# Patient Record
Sex: FEMALE | Race: WHITE
[De-identification: names, ages, dates, MRNs, and addresses within clinical notes are randomized per-mention and may not be internally consistent; named-entity substitution may affect disease eponyms.]

---

## 2019-04-04 NOTE — NUR
PATIENT IS ALERT AND ORIENTED. PATIENT IS SBA TO ONE ASSIST WITH CANE AND
GAITBELT. PATIENT IS ON 3L NC WHICH IS BASE. LINE. PATIENT HAS CHEST PAIN NON
CARDIAC FROM COPD TREATED WITH TYLENOL. PATIENT IS ACHS. PATIENT IS NSR TO ST
ON TELE. PATIENTS BP HAS BEEN STABLE SENSE COMING TO THE FLOOR. PATIENT LBM
WAS TODAY. PATIENT IS RESTING COMFORTABLY NO COMPLAINTS AT THIS TIME. WCM

## 2019-04-04 NOTE — NUR
ASSESSMENT: CM REVIEWED CHART AND MET WITH PATIENT AT THE BEDSIDE. PT IS ALERT
AND ORIENTED X4. PT REPORTS SHE LIVES AT HOME WITH HER FAMILY. PT REPORTS
ABOUT 5-10 STEPS TO ENTER HER HOME WITH HANDRAILS. PT REPORTS SHE IS CURRENTLY
IN SERVICES WITH [facility] AND PLANS TO RESUME AT DISCHARGE. CM
FAXED REFERRAL TO [facility]. PT STATES SHE HAS A WALKER AT HOME BUT
NORMALLY USES A CANE. PT STATES SHE HAS A BIPAP AT HOME AND ALSO OXYGEN AT
HOME WHICH IS ARRANGED THROUGH [facility] FOR HOME. PT PLANS TO RETURN HOME WITH HOME
HEALTH ONCE MEDICALLY STABLE.

## 2019-04-04 NOTE — NUR
PT ALERT AND ORIENTED TIMES FOUR. BP LOW BUT STABLE, 98% 3L, SR ON TELE. PT
C/O PAIN PRN PAIN MEDICATIONS GIVEN WITH GOOD RELEIF. PT UP TO CHAIR FOR MOST
OF THE DAY. PT TOLERATES MEDS AND MEALS. PT SLOWLY PROGRESSING TOWRADS POC
GOALS.

## 2019-04-05 NOTE — NUR
FALL RISK IN PLACE.  PT IS KNOWN TO UNIT FROM PREVIOUS VISITS.  FOLLOWING POC
WITH IVF TO BRING ELECTROLYTES BACK IN PARAMETERS.  BREATHING TREATMENTS
CONTINUING AND MONITORING BLOOD SUGARS.  PT CALLS OUT FOR PO PAIN MEDICATION
ON Q4 BASIS.  HOURLY ROUNDING.

## 2019-04-05 NOTE — NUR
Nutrition: assess d/t high BMI and sepsis dx. Pt admitted for UTI, early
sepsis, and hypotension. Pt was sleeping during attempted visit. No family
present. Pt has been admitted multiple times in the past and RD education has
been provided. Wt hx shows relatively stable wt of 250-300 lbs over past 4
years. Sepsis is improving per nursing. Consider low nutrition risk at this
time.

## 2019-04-05 NOTE — NUR
ON-GOING ASSESSMENT: CM REVIEWED CHART AND MET WITH PT. PT HAS ORDERS TO
DISCHARGE HOME TODAY WITH HH. CM NOTIFIED [facility] WHO STATES THEY CAN
ACCEPT HER AND CM FAXED DISCHARGE ORDERS AND CONFIRMED THEY RECEIVED IT. PT
REPORTS SHE DOES NOT HAVE TRANSPORTATION HOME. PT HAS MEDICAID SECONDARY SO CM
CONTACTED [facility] [phone] AND ARRANGED CAB TO TAKE PATIENT HOME. CM
NOTIFIED THEM THAT PATIENT IS REQUIRING OXYGEN AND THEY STATE THEY CANNOT
PROVIDE IT BUT IT WE SENT PT WITH A PORTABLE TANK THEY WILL RETURN IT. CM
VERIFIED WITH  DIRECTOR AND APPROVAL TO SEND WITH TANK THAT THEY WILL
RETURN.  TRIP# IS [id number]. BEDSIDE RN NOTIFIED AND [employer] IS TO CONTACT
THEM ONCE THEY HAVE ARRIVED.
CONTACT [facility] IF NEEDED [phone] TRIP#[id number].

## 2019-10-03 NOTE — EKG
[address] [address] [address]
[place]  [address]
Phone:  [phone]                    ELECTROCARDIOGRAM REPORT      
_______________________________________________________________________________
 
Name:       HARINI EASLEY                  Room #:                     REG ER  
M.R.#:      [id number]     Account #:      [id number]  
Admission:  10/03/19    Attend Phys:                          
Discharge:              Date of Birth:  [birth year]  
                                                          Report #: [id number]
   [id number]
_______________________________________________________________________________
THIS REPORT FOR:   //name//                          
 
                         [facility] ED
                                       
Test Date:    2019-10-03               Test Time:    14:23:36
Pat Name:     HARINI EASLEY               Department:   
Patient ID:   [id number]            Room:          
Gender:       F                        Technician:   
:          1970               Requested By: Cipriano Rapp
Order Number: [id number] MD:   Emir Mosquera
                                 Measurements
Intervals                              Axis          
Rate:         80                       P:            -3
OR:           162                      QRS:          16
QRSD:         102                      T:            -17
QT:           393                                    
QTc:          454                                    
                           Interpretive Statements
Sinus rhythm
Early transition
Baseline artifact
Compared to ECG 2019 16:19:16
Sinus tachycardia no longer present
Atrial premature complex(es) no longer present
 
Electronically Signed On 10-3-2019 16:18:21 CDT by Emir Mosquera
[email or web address]
 
 
 
 
 
 
 
 
 
 
 
 
 
 
 
 
  <ELECTRONICALLY SIGNED>
   By: Emir Mosquera MD    
  10/03/19     1618
D: 10/03/19 1423                           _____________________________________
T: 10/03/19 1423                           Emir Mosquera MD      /DELILAH

## 2019-10-22 NOTE — NUR
Pain Clinic Assessment:
 
1. History of Osteoarthritis:
Left Lower Extremity
Right Lower Extremity
   History of Rheumatoid Arthritis:
Not Applicable
 
2. Height: 5 ft. 2 in. 157.5 cm.
   Weight: 220.0 lb.  oz. 99.792 kg.
   Patient's BMI: 40.2
 
3. Vital Signs:
   BP: 113/76 Pulse: 102 Resp: 14
   Temp:  02 Sat: 97 ECG Mon:
 
4. Pain Intensity: 8
 
5. Fall Risk:
   Dizziness: Y  Needs help standing or walking: Y
   Fallen in the last 3 months: Y
   Fall risk comments:
 
 
6. Patient on Blood Thinner: PRADAXA
 
7. History of Hypertension: Y
 
8. Opioid Therapy greater than 6 weeks: Y
   Opiate Contract Signed:
 
9. Risk Assessment Tool Provided:
 
10. Functional Assessment Tool:
 
11. Recreational Drug Use: Past greater than 3 mos Drug Type: METH AND CRACK
    Tobacco Use: Former Smoker Tobacco Type: Cigarettes
       Amount or Packs/day:  How Many Years:
    Alcohol Use: No  Frequency:  Quant:

## 2019-11-05 NOTE — HPC
[facility]
[place]   [address]                     PAIN MANAGEMENT CONSULTATION  
_______________________________________________________________________________
 
Name:       HARINI EASLEY ROSE                  Room #:                     REG [facility]#:      [id number]                       Account #:      [id number]  
Admission:  10/22/19    Attend Phys:    Dominguez Banerjee DO  
Discharge:              Date of Birth:  [birth year]  
                                                          Report #: [id number]
                                                                    [id number]   
_______________________________________________________________________________
THIS REPORT FOR:   //name//                          
 
CC: Dominguez Anderson MD
 
DATE OF SERVICE:  10/22/2019
 
 
REFERRING PHYSICIAN:  Dr. Micheline Anderson.
 
CHIEF COMPLAINT:  Neck pain, bilateral upper extremity pain with paresthesias,
low back pain, bilateral lower extremity pain with paresthesias.
 
HISTORY OF PRESENT ILLNESS:  As you know, the patient is a very unfortunate
49-year-old female referred to our service to discuss the possibility of
undergoing cervical epidural injections under fluoroscopic guidance.  The
patient reports she has had longstanding pain issues with progressively
worsening symptoms.  She has sought evaluation from multiple pain physicians. 
We are the third pain physician the patient is seen.  She has recently been seen
at pain care of  who advised the patient that "her case was too dangerous to
be performed in an outpatient setting."  The patient returned to her PCP and was
subsequently referred to our clinic to discuss this option for interventional
treatments to be provided at a hospital setting.  The patient indicates she has
had pain since 2000.  She denies injury or trauma.  She has had neck pain
issues, specifically on and off since the year 2000, has had chronic low back
pain and lower extremity symptoms, for which she has undergone epidural
injections in the past without complication.  She has unfortunately received
very little benefit with epidural injections in the lumbar spine, reporting only
2-day improvement with these procedures.  She has subsequently discontinued
these activities.  She reports to us today without any new imaging of the lumbar
spine and thus I cannot comment further on this issue.  She does report pain in
the neck, for which she gives a rating of 10/10.  She has minor changes in the
cervical spine based on MRI nearly 1 year ago.  She was referred to our clinic
to discuss the options for treatment.  The patient states that she has had a
history of polysubstance abuse in the past.  She does indicate that her primary
care physician has started her on hydrocodone 10/325 and has had her on those
for about 3 years.  She is requesting to continue this medication and has been
advised she needs to contact our clinic to be seen and advise whether or not
this is an appropriate treatment course.  She is also requesting this
information will be provided to the prescribing physician, Dr. Anderson.
 
PAST MEDICAL HISTORY:
1.  Diabetes mellitus type 2.
2.  Seizures. 
3.  Asthma.
 
 
 
[place] [address]                     PAIN MANAGEMENT CONSULTATION  
_______________________________________________________________________________
 
Name:       TRACEEHARINI                  [id number]                       Account #:      [id number]  
Admission:  10/22/19    Attend Phys:    Dominguez Banerjee DO  
Discharge:              Date of Birth:  [birth year]  
                                                          Report #: [id number]
                                                                    [id number]   
_______________________________________________________________________________
4.  Hypertension.
5.  Chronic obstructive pulmonary disease.
6.  Chronic kidney disease.
7.  Gastroesophageal reflux disease.
8.  Degenerative joint disease.
9.  Osteoarthritis.
10.  History of necessary anticoagulation.
11.  Restless leg syndrome.
12.  Anxiety disorder.
13.  Chronic intractable pain.
 
PAST SURGICAL HISTORY:
1.  Partial hysterectomy.
2.  Dilation and curettage.
3.  Rectocele excision.
4.  Implantation of a transitional mesh.
 
SOCIAL HISTORY:  The patient is a reformed smoker, quit about 6 years ago with
3-pack-year history of smoking.  She denies IV or illicit drug use, but has an
extensive history of this.  She denies any chronic alcohol use.  She is on
disability for nearly 4 years.  She is not in litigation in regards to her
symptoms.  She is unaccompanied today.
 
REVIEW OF SYSTEMS:  Positive for weight gain, headaches, eye disease, wearing
corrective eyewear, blurred and double vision, cataracts, hearing loss with
tinnitus, chronic sinus problems with rhinitis, shortness of breath, heart
trouble, palpitations, nausea, vomiting, frequent urination, nocturia,
lightheadedness, dizziness, convulsions and seizures, numbness and tingling
sensations, nervousness, depression, diabetes mellitus insulin dependent,
excessive thirst and urination, slow to heal after cuts.  All other review of
systems negative per 12-point review of systems other than those listed in
history of present illness.
 
Pain impact score 48 of 70 indicating severe interference of daily activities
secondary to pain.
 
ALLERGIES:  PAPER TAPE, LATEX, SULFA, PROCHLORPERAZINE, FENTANYL, TRAMADOL,
BUSPIRONE, KETOROLAC, PENICILLIN, ALPRAZOLAM, METAXALONE.
 
CURRENT MEDICATIONS:  Doxycycline 100 mg twice a day, magnesium oxide 64 mg
twice a day, potassium chloride 20 mEq p.o. every day, furosemide 20 mg twice a
day, acetaminophen 325 mg every 6 hours, nystatin twice a day, Latuda 20 mg once
a day, ibuprofen 800 mg twice a day, losartan 25 mg once a day, MiraLax 17 grams
once a day, metformin 500 mg twice a day, Humalog sliding scale, lispro 5 units
with meals, Pradaxa 75 mg once a day, cyclobenzaprine 10 mg 3 times a day
p.r.n., Toujeo SoloStar 15 units twice a day, Tradjenta 5 mg once a day, [facility]
[address] [place]   [address]                     PAIN MANAGEMENT CONSULTATION  
_______________________________________________________________________________
 
Name:       HARINI EASLEY ROSE                  Room #:                     REG [facility]#:      [id number]                       Account #:      [id number]  
Admission:  10/22/19    Attend Phys:    Dominguez Banerjee DO  
Discharge:              Date of Birth:  [birth year]  
                                                          Report #: [id number]
                                                                    [id number]   
_______________________________________________________________________________
4 mg twice a day, diazepam 5 mg p.r.n., escitalopram 10 mg per day, diazepam 10
mg p.o. at bedtime, latanoprost one drop each eye per day, Keppra 500 mg twice a
day, gabapentin 400 mg 3 times a day, ropinirole 0.25 mg 3 times a day, Advair
250/50 one puff b.i.d., albuterol 2 puffs q. 4 hours p.r.n., montelukast sodium
10 mg per day.
 
IMAGING:  MRI cervical spine 12/29/2018 shows mild to moderate neural foraminal
stenosis and uncovertebral facet arthropathy at the C5-C6, C6-C7 and there is
mild diffuse disk bulging at C4-C5, C5-C6, C6-C7 and C7-T1 and no significant
central canal stenosis.
 
PQRS:  The patient has arthritic changes of the lumbar spine, bilateral hips,
bilateral knees and cervical spine.  No rheumatoid arthritis, placing pain
intensity at 8/10.  She is at a fall risk and has had multiple falls.  She is
using ambulatory devices.  She is on blood thinners in the form of Pradaxa.  She
is treated for hypertension.  She is on chronic opioids.  She has a high risk
for opioid addiction based on our assessment tool.  Pain impact is 48 of 70,
severe interference of daily activities secondary to pain.
 
PHYSICAL EXAMINATION:
VITAL SIGNS:  Blood pressure 113/76, pulse 102, respiratory rate 14 and
unlabored.  The patient is 97% on room air.  Height 5 feet 2 inches tall, weight
220 pounds, BMI calculated 40.2.
GENERAL:  Well-developed, well-nourished, well-hydrated 49-year-old female
appearing much older than stated age.  She is placing pain today at 8/10.
HEENT:  Normocephalic, atraumatic.  Pupils equal, round, reactive to light. 
Extraocular muscles are intact.  Sclerae are nonicteric without injection.
NEUROLOGIC:  Cranial nerves 2-12 are grossly intact.  Speech is fluent.  The
patient deemed a poor historian.
LUNGS:  Decreased breath sounds bilaterally, prolonged expiratory phase.  The
patient is utilizing concentrated oxygen to maintain O2 saturation.  There is
expiratory wheezing noted bilaterally.
CARDIOVASCULAR:  Regular.  No appreciable gallop, no rub.
ABDOMEN:  Soft, obese.  Bowel sounds are present.
EXTREMITIES:  Show clubbing in the upper extremities that is mild in nature, no
cyanosis, and no edema.
MUSCULOSKELETAL:  There is some palpatory tenderness noted over the paraspinal
musculature of cervical spine.  No spinous process tenderness.  Upper extremity
strength is deconditioned bilaterally, but strength is equal.  Muscle bulk and
tone is equal and symmetrical in comparing left upper extremity to right upper
extremity.  Deep tendon reflexes are reduced bilaterally, 1+/4 but symmetrical. 
Cervical provocation testing is met with increasing pain with lateral flexion,
rotation and extension, both the left and right.  Spurling's test is negative.
 
ASSESSMENT:
1.  Chronic cervical radiculopathy.
 
 
 
[address]   [address]                     PAIN MANAGEMENT CONSULTATION  
_______________________________________________________________________________
 
Name:       HARINI EASLEY                  Room #:                     REG CLI 
[facility]#:      [id number]                       Account #:      [id number]  
Admission:  10/22/19    Attend Phys:    Dominguez Banerjee DO  
Discharge:              Date of Birth:  [birth year]  
                                                          Report #: [id number]
                                                                    [id number]   
_______________________________________________________________________________
2.  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
3.  Cervical facet syndrome.
4.  History of polysubstance abuse.
5.  History of chronic low back pain.
 
PLAN:
1.  Based on today's physical exam and history the patient has provided, the
description the patient uses in regards to pain as well as the findings of the
year old MRI, it would appear the symptoms that the patient is experiencing are
due to the foraminal stenosis at C4-C5 and C5-C6.  I am pleased to advise the
patient that there are no central canal stenosis issues present.  After we have
reviewed the patient's year old MRI, we discussed the potential treatment
options with the patient today.  The following was discussed with the patient,
the treatment options we would recommend.
 
We discussed physical therapy, stretching exercises and traction techniques. 
This could be quite effective in this patient's case as her symptoms are related
to foraminal stenosis, which you are aware are formed by the arch of the
vertebral body above and the vertebral body below and thus traction techniques
can alleviate some of the pressure on the nerves as they exit the neural
foramen.  We discussed medication management, for which we would only recommend
nonsteroidal anti-inflammatory and escalating doses of neuropathic medications. 
She is currently on a fairly low dose of gabapentin, which could be escalated
quite easily.  Other medications that could be added nortriptyline,
amitriptyline and possible Cymbalta, though this would have to be exchanged with
her antidepressant medications she is currently on.  We discussed cervical
epidural injections, for which the patient was referred to our clinic.  We also
discussed surgical options with the patient today.  After reviewing the risks
and the benefits of all proposed treatment options, the patient chose to undergo
a cervical epidural injection under fluoroscopic guidance.
 
2.  The patient was advised that due to third party payer restrictions,
authorization will have to be obtained.  Authorization could take anywhere from
7-10 days.  We will begin this authorization process immediately.  Once this
authorization has been completed, we will then contact the patient and have her
come off the Pradaxa in an appropriate amount of timeframe based on ANTONINO
guidelines.  The patient will need to gain clearance from the prescribing
physician to come off the Pradaxa to be able to undergo the procedure.  Pradaxa
must be discontinued based on ANTONINO guidelines, no foreshortening of the
timeframe.  She will have to be off the medication in appropriate timeframe.
 
3.  The patient has indicated to this physician that she is currently utilizing
hydrocodone 10/325 up to 3 tablets per day.  Apparently, she has been on this
medication for an extended period of time.  This was initiated by her PCP.  I
have advised the patient at this appointment that we are not actively taking
opioid management patients.  Given the current political environment around
 
 
 
[place] [address]                     PAIN MANAGEMENT CONSULTATION  
_______________________________________________________________________________
 
Name:       HARINI EASLEY                  Room #:                     REG CL 
[facility]#:      [id number]                       Account #:      [id number]  
Admission:  10/22/19    Attend Phys:    Dominguez Banerjee DO  
Discharge:              Date of Birth:  [birth year]  
                                                          Report #: [id number]
                                                                    [id number]   
_______________________________________________________________________________
opioids in the recent completed lawsuits against opioid manufactures, we do not
feel these medications will be available for any length of time and thus we are
not initiating any patients on opioid medications that have not been on them
prescribed through our services.  All these medications will ultimately be
discontinued whether it is from a legal ramification or by [facility]MAR discontinuing the
use of these medications in chronic pain, it remains to be seen, but again we
are not taking on any new opioid medication patients at this time.  The patient
indicates that she is comfortable with her medication; it allows her to go about
her activities of daily living.  Obviously, the primary care physician who
initiated this therapy considers this as a viable option for treatment and we
will defer to them if they wish to continue this therapy.  We again will not be
involved in opioid medication management in this patient's case.
 
4.  We will see the patient back in followup visit for the cervical epidural
injection proposed to treat the neck pain that she is experiencing.  We have
advised the patient that she is a surgical candidate, though her underlying
health conditions may preclude her from undergoing surgery.  She does have
pathology at the C4-C5 and C5-C6 levels, which may be amenable to surgical
approach.  She could consider this option.  We would recommend she follow up
with her PCP in regards to referrals for this type of surgical procedure if she
wishes to do so.
 
5.  The patient has sought treatment from multiple pain physicians.  She has
been advised by each of those physicians that resolution of her pain is not
possible.  We have reiterated this today.  Pain the type that the patient is
experiencing is difficult at best to get controlled.  We will do our best to
provide injections to improve her cervical radicular symptoms, but if these are
ineffective, more aggressive treatment will likely be necessary.
 
6.  We wish to thank Dr. Anderson for the referral of the patient to our clinic. 
We will keep you apprised of response to treatment as we address cervical
radiculopathy with cervical epidural injections.  Again, we wish to thank Dr. Anderson for this opportunity.
 
 
 
 
 
 
 
 
 
 
 
  <ELECTRONICALLY SIGNED>
   By: Dominguez Banerjee DO          
  11/05/19     1247
D: 10/22/19 1824                           _____________________________________
T: 10/22/19 2225                           Dominguez Banerjee DO            /nt

## 2020-07-01 ENCOUNTER — HOSPITAL ENCOUNTER (EMERGENCY)
Dept: HOSPITAL 35 - ER | Age: 50
Discharge: HOME | End: 2020-07-01
Payer: COMMERCIAL

## 2020-07-01 VITALS — DIASTOLIC BLOOD PRESSURE: 74 MMHG | SYSTOLIC BLOOD PRESSURE: 109 MMHG

## 2020-07-01 VITALS — HEIGHT: 62 IN | BODY MASS INDEX: 45.08 KG/M2 | WEIGHT: 245 LBS

## 2020-07-01 DIAGNOSIS — Z79.84: ICD-10-CM

## 2020-07-01 DIAGNOSIS — Z87.891: ICD-10-CM

## 2020-07-01 DIAGNOSIS — J44.9: ICD-10-CM

## 2020-07-01 DIAGNOSIS — E11.9: ICD-10-CM

## 2020-07-01 DIAGNOSIS — Z88.2: ICD-10-CM

## 2020-07-01 DIAGNOSIS — Z88.8: ICD-10-CM

## 2020-07-01 DIAGNOSIS — Z91.040: ICD-10-CM

## 2020-07-01 DIAGNOSIS — Z91.048: ICD-10-CM

## 2020-07-01 DIAGNOSIS — Z88.0: ICD-10-CM

## 2020-07-01 DIAGNOSIS — Z90.710: ICD-10-CM

## 2020-07-01 DIAGNOSIS — Z79.899: ICD-10-CM

## 2020-07-01 DIAGNOSIS — K85.90: Primary | ICD-10-CM

## 2020-07-01 DIAGNOSIS — Z79.2: ICD-10-CM

## 2020-07-01 LAB
ALBUMIN SERPL-MCNC: 3.2 G/DL (ref 3.4–5)
ALT SERPL-CCNC: 33 U/L (ref 30–65)
ANION GAP SERPL CALC-SCNC: 8 MMOL/L (ref 7–16)
ANISOCYTOSIS BLD QL SMEAR: (no result)
AST SERPL-CCNC: 27 U/L (ref 15–37)
BASOPHILS NFR BLD AUTO: 1 % (ref 0–2)
BILIRUB SERPL-MCNC: < 0.1 MG/DL (ref 0.2–1)
BILIRUB UR-MCNC: NEGATIVE MG/DL
BUN SERPL-MCNC: 14 MG/DL (ref 7–18)
CALCIUM SERPL-MCNC: 8.4 MG/DL (ref 8.5–10.1)
CHLORIDE SERPL-SCNC: 100 MMOL/L (ref 98–107)
CO2 SERPL-SCNC: 30 MMOL/L (ref 21–32)
COLOR UR: YELLOW
CREAT SERPL-MCNC: 0.8 MG/DL (ref 0.6–1)
EOSINOPHIL NFR BLD: 0 % (ref 0–3)
ERYTHROCYTE [DISTWIDTH] IN BLOOD BY AUTOMATED COUNT: 25.5 % (ref 10.5–14.5)
GLUCOSE SERPL-MCNC: 100 MG/DL (ref 74–106)
GRANULOCYTES NFR BLD MANUAL: 65 % (ref 36–66)
HCT VFR BLD CALC: 30.7 % (ref 37–47)
HGB BLD-MCNC: 9.8 GM/DL (ref 12–15)
INR PPP: 1
KETONES UR STRIP-MCNC: NEGATIVE MG/DL
LIPASE: 482 U/L (ref 73–393)
LYMPHOCYTES NFR BLD AUTO: 25 % (ref 24–44)
MCH RBC QN AUTO: 24.9 PG (ref 26–34)
MCHC RBC AUTO-ENTMCNC: 32 G/DL (ref 28–37)
MCV RBC: 77.9 FL (ref 80–100)
MICROCYTES: (no result)
MONOCYTES NFR BLD: 9 % (ref 1–8)
NEUTROPHILS # BLD: 9.2 THOU/UL (ref 1.4–8.2)
PLATELET # BLD EST: NORMAL 10*3/UL
PLATELET # BLD: 301 THOU/UL (ref 150–400)
POTASSIUM SERPL-SCNC: 3.6 MMOL/L (ref 3.5–5.1)
PROT SERPL-MCNC: 6.7 G/DL (ref 6.4–8.2)
PROTHROMBIN TIME: 9.9 SECONDS (ref 9.3–11.4)
RBC # BLD AUTO: 3.94 MIL/UL (ref 4.2–5)
RBC # UR STRIP: NEGATIVE /UL
SCHISTOCYTES BLD QL SMEAR: (no result)
SODIUM SERPL-SCNC: 138 MMOL/L (ref 136–145)
SP GR UR STRIP: <= 1.005 (ref 1–1.03)
TROPONIN I SERPL-MCNC: <0.06 NG/ML (ref ?–0.06)
URINE CLARITY: (no result)
URINE GLUCOSE-RANDOM*: NEGATIVE
URINE LEUKOCYTES-REFLEX: NEGATIVE
URINE NITRITE-REFLEX: NEGATIVE
URINE PROTEIN (DIPSTICK): NEGATIVE
UROBILINOGEN UR STRIP-ACNC: 0.2 E.U./DL (ref 0.2–1)
WBC # BLD AUTO: 14.2 THOU/UL (ref 4–11)

## 2020-07-02 NOTE — EKG
[facility]
[place]   [address]                     ELECTROCARDIOGRAM REPORT      
_______________________________________________________________________________
 
Name:       HARINI EASLEY                  Room #:                     [facility]#:      [id number]                       Account #:      [id number]  
Admission:  20    Attend Phys:                          
Discharge:  20    Date of Birth:  [birth year]  
                                                          Report #: [id number]
                                                                    [id number]
_______________________________________________________________________________
THIS REPORT FOR:  
 
cc:  Micheline Anderson MD, Nora P. MD Lundgren, Craig H. MD [facility]                                        ~
THIS REPORT FOR:   //name//                          
 
                         [facility] ED
                                       
Test Date:    2020               Test Time:    12:11:23
Pat Name:     HARINI EASLEY               Department:   
Patient ID:   [id number]            Room:          
Gender:       F                        Technician:   [facility]
:          1970               Requested By: Lata Da Silva
Order Number: [id number] MD:   Aleksandar Iraheta
                                 Measurements
Intervals                              Axis          
Rate:         104                      P:            39
WV:           137                      QRS:          28
QRSD:         97                       T:            73
QT:           388                                    
QTc:          511                                    
                           Interpretive Statements
Sinus tachycardia
Borderline repolarization abnormality
Prolonged QT interval
Compared to ECG 10/03/2019 14:23:36
Prolonged QT interval now present
Electronically Signed On 2020 8:49:24 CDT by Aleksandar Iraheta
[email or web address]
 
 
 
 
 
 
 
 
 
 
 
 
 
 
  <ELECTRONICALLY SIGNED>
   By: Aleksandar Iraheta MD, [facility]   
  20     0849
D: 20 1211                           _____________________________________
T: 20 1211                           Aleksandar Iraheta MD, [facility]     /EPI

## 2020-12-20 ENCOUNTER — HOSPITAL ENCOUNTER (INPATIENT)
Dept: HOSPITAL 96 - M.ERS | Age: 50
LOS: 4 days | Discharge: HOME HEALTH | DRG: 917 | End: 2020-12-24
Attending: INTERNAL MEDICINE | Admitting: INTERNAL MEDICINE
Payer: COMMERCIAL

## 2020-12-20 VITALS — HEIGHT: 65.98 IN | WEIGHT: 293 LBS | BODY MASS INDEX: 47.09 KG/M2

## 2020-12-20 VITALS — SYSTOLIC BLOOD PRESSURE: 117 MMHG | DIASTOLIC BLOOD PRESSURE: 62 MMHG

## 2020-12-20 DIAGNOSIS — E66.2: ICD-10-CM

## 2020-12-20 DIAGNOSIS — Z20.828: ICD-10-CM
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
## 2020-12-20 DIAGNOSIS — J96.21: ICD-10-CM

## 2020-12-20 DIAGNOSIS — G43.909: ICD-10-CM

## 2020-12-20 DIAGNOSIS — Z79.899: ICD-10-CM

## 2020-12-20 DIAGNOSIS — T40.2X1A: Primary | ICD-10-CM

## 2020-12-20 DIAGNOSIS — E78.5: ICD-10-CM

## 2020-12-20 DIAGNOSIS — K21.9: ICD-10-CM

## 2020-12-20 DIAGNOSIS — J69.0: ICD-10-CM

## 2020-12-20 DIAGNOSIS — J96.22: ICD-10-CM

## 2020-12-20 DIAGNOSIS — Z87.891: ICD-10-CM

## 2020-12-20 DIAGNOSIS — Z88.2: ICD-10-CM

## 2020-12-20 DIAGNOSIS — Z88.8: ICD-10-CM

## 2020-12-20 DIAGNOSIS — Z23: ICD-10-CM

## 2020-12-20 DIAGNOSIS — Z79.84: ICD-10-CM

## 2020-12-20 DIAGNOSIS — F32.9: ICD-10-CM

## 2020-12-20 DIAGNOSIS — Z90.49: ICD-10-CM

## 2020-12-20 DIAGNOSIS — E11.9: ICD-10-CM

## 2020-12-20 DIAGNOSIS — Z86.73: ICD-10-CM

## 2020-12-20 DIAGNOSIS — G92: ICD-10-CM

## 2020-12-20 DIAGNOSIS — F41.1: ICD-10-CM

## 2020-12-20 DIAGNOSIS — G89.29: ICD-10-CM

## 2020-12-20 DIAGNOSIS — Z88.0: ICD-10-CM

## 2020-12-20 LAB
ABSOLUTE BASOPHILS: 0 THOU/UL (ref 0–0.2)
ABSOLUTE EOSINOPHILS: 0.1 THOU/UL (ref 0–0.7)
ABSOLUTE MONOCYTES: 0.6 THOU/UL (ref 0–1.2)
ALBUMIN SERPL-MCNC: 3.1 G/DL (ref 3.4–5)
ALP SERPL-CCNC: 71 U/L (ref 46–116)
ALT SERPL-CCNC: 35 U/L (ref 30–65)
ANION GAP SERPL CALC-SCNC: 9 MMOL/L (ref 7–16)
AST SERPL-CCNC: 15 U/L (ref 15–37)
BASOPHILS NFR BLD AUTO: 0.3 %
BE: 5.3 MMOL/L
BILIRUB SERPL-MCNC: 0.1 MG/DL
BUN SERPL-MCNC: 17 MG/DL (ref 7–18)
CALCIUM SERPL-MCNC: 8.8 MG/DL (ref 8.5–10.1)
CHLORIDE SERPL-SCNC: 96 MMOL/L (ref 98–107)
CO2 SERPL-SCNC: 35 MMOL/L (ref 21–32)
CREAT SERPL-MCNC: 0.9 MG/DL (ref 0.6–1.3)
EOSINOPHIL NFR BLD: 1 %
GLUCOSE SERPL-MCNC: 207 MG/DL (ref 70–99)
GRANULOCYTES NFR BLD MANUAL: 73.2 %
HCT VFR BLD CALC: 34.9 % (ref 37–47)
HGB BLD-MCNC: 11.3 GM/DL (ref 12–15)
LYMPHOCYTES # BLD: 2.1 THOU/UL (ref 0.8–5.3)
LYMPHOCYTES NFR BLD AUTO: 20.1 %
MAGNESIUM SERPL-MCNC: 1.6 MG/DL (ref 1.8–2.4)
MCH RBC QN AUTO: 28.8 PG (ref 26–34)
MCHC RBC AUTO-ENTMCNC: 32.5 G/DL (ref 28–37)
MCV RBC: 88.5 FL (ref 80–100)
MONOCYTES NFR BLD: 5.4 %
MPV: 6.9 FL. (ref 7.2–11.1)
NEUTROPHILS # BLD: 7.5 THOU/UL (ref 1.6–8.1)
NT-PRO BRAIN NAT PEPTIDE: 41 PG/ML (ref ?–300)
NUCLEATED RBCS: 0 /100WBC
PCO2 BLD: 79.3 MMHG (ref 35–45)
PLATELET COUNT*: 340 THOU/UL (ref 150–400)
PO2 BLD: 83.3 MMHG (ref 75–100)
POTASSIUM SERPL-SCNC: 2.9 MMOL/L (ref 3.5–5.1)
PROT SERPL-MCNC: 6.5 G/DL (ref 6.4–8.2)
RBC # BLD AUTO: 3.95 MIL/UL (ref 4.2–5)
RDW-CV: 14.8 % (ref 10.5–14.5)
SODIUM SERPL-SCNC: 140 MMOL/L (ref 136–145)
WBC # BLD AUTO: 10.3 THOU/UL (ref 4–11)

## 2020-12-20 NOTE — EMS
[facility] 
[address] [place]  [address]                    EMS Patient Care Report       
_______________________________________________________________________________
 
Name:       HARINI EASLEY                   Room:           M.212-P    DIS IN  
M.R.#:  [id number]      Account #:      [id number]  
Admission:  20     Attend Phys:    Rachana Hamilton MD 
Discharge:  20     Date of Birth:  [birth year]  
         Report #: [id number]
                                                                     [id number]
_______________________________________________________________________________
THIS REPORT FOR:  //name//                      
 
Report Transmitted: 2020 12:11
EMS Care Summary
SLOANE VERNON
Incident [id number] @ 2020 21:36
 
Incident Location
[address] [place] [address]
 
Patient
Harini Esaley
Female, 50 Years
 1970
 
Patient Address
[address] [place] [address]
 
Patient History
Chronic Obstructive Pulmonary Disease (COPD),Type 2 diabetes mellitus,Obesity, 
unspecified,Anxiety disorder, unspecified,Edema, 
unspecified,Cholecystectomy,Hyperlipidemia,Gastro-Esophageal Reflux Disease 
(GERD),Polyneuropathy, unspecified,Other chronic pain, 
 
Patient Allergies
,
 
Patient Medications
Oxygen,
 
Chief Complaint
Altered Level of Consciousness
 
Disposition
Transported No Lights/[place]
 
Dispatch Reason
Diabetic Problem
 
Transported To
[facility]
 
Narrative
 WAS DISPATCHED TO A RESIDENCE FOR A 50 YEAR OLD FEMALE WITH AN ALTERED 
 
 
 
[facility] 
[address] [place]  [address]                    EMS Patient Care Report       
_______________________________________________________________________________
 
Name:       HARINI EASLEY                   Room:           M.212-P    DIS IN  
M.R.#:  [id number]      Account #:      [id number]  
Admission:  20     Attend Phys:    Rachana Hamilton MD 
Discharge:  20     Date of Birth:  [birth year]  
         Report #: [id number]
                                                                     [id number]
_______________________________________________________________________________
LEVEL OF CONSCIOUSNESS. UPON ARRIVAL, [facility] AND [facility] PERSONNEL ENTERED THE 
RESIDENCE AND MADE CONTACT WITH A FEMALE SITTING IN A COMFORTABLE UPRIGHT 
POSITION ON THE COUCH IN A STUPOROUS STATE WITH UNLABORED RESPIRATIONS AND 
RECEIVING HOME OXYGEN VIA NASAL CANNULA AT 4LPM. A MALE RESIDENT ON SCENE (THE 
CALLER) REPORTED WALKING INTO THE ROOM TO FIND HARINI IN AN ALTERED STATE AND 
WAS UNABLE TO GET HER TO RESPOND. HARINI WOULD OPEN HER EYES AND RESPOND WITH 1 
WORD SENTENCES (OR NOISES) WITH CONTINUAL VERBAL STIMULI AND THE OCCASIONAL 
NEED FOR PAINFUL STIMULI. VITAL SIGNS WERE OBTAINED AND A PRIMARY ASSESSMENT 
WAS EXECUTED WITH AN OXYGEN SATURATION OF % ON 4 LPM HOME OXYGEN AND PINPOINT 
PUPILS. NARCAN WAS ADMINISTERED INTRANASALLY CAUSING QUICK IMPROVEMENT IN 
RESPONSIVENESS AND OXYGEN WAS INCREASED TO 12 LPM VIA NRB. HARINI WAS ABLE TO 
ANSWER SOME QUESTIONS AND FOLLOW INSTRUCTIONS WITH ASSISTANCE BUT REMAINED 
LETHARGIC AS SHE WAS TRANSFERRED OUTSIDE TO THE [facility] VIA STAIRCHAIR WHERE SHE 
WAS SECURED VIA 5 SAFETY STRAPS PRIOR TO BEING SECURED INTO THE AMBULANCE. 
WHILE IN THE AMBULANCE, HARINI REPORTED MILD SHORTNESS OF AIR AND DIZZINESS 
THROUGHOUT THE DAY AND STATED THAT SHE DOESNT REMEMBER EMS ARRIVING AT THE 
HOUSE. TREATMENTS AND INTERVENTIONS WERE CONTINUED DURING TRANSPORT TO [facility] WITH SOME IMPROVEMENT IN OXYGEN SATURATION AND MENTAL STATUS THOUGH 
HARINI REMAINED LETHARGIC. UPON ARRIVAL, HARINI WAS REMOVED FROM THE UNIT AND 
TAKEN INSIDE TO ER 1 WHERE SHE WAS TRANSFERRED LATERALLY ONTO THE ER BED VIA 
DRAW SHEET. REPORT WAS GIVEN TO THE RECEIVING RN AND CARE WAS TRANSFERRED. [facility] 
320 BACK IN SERVICE. 
 
Initial Vitals
@21:47SpO2: 65,
@21:58SpO2: 97,
@22:00SpO2: 82,
@22:03SpO2: 84,
@22:05SpO2: 85,
@22:10SpO2: 84,
@22:27SpO2: 78,
@21:55
@22:03
@22:04
@22:12Temp: [id number],
@21:44P: 74,R: 24,BP: 116/50,
@22:35P: 89,R: 24,BP: 124/68,
@22:76GjNI9: 52,
@22:45YdEW4: 67,
@22:45TgEQ9: 49,
@22:88WtKG9: 61,
@22:58XzPR0: 64,
@22:84QvRI2: 57,
@22:05GrKW6: 57,
@22:05YaQN8: 62,
@22:91NuYW8: 66,
 
 
 
[place] [address]                    EMS Patient Care Report       
_______________________________________________________________________________
 
Name:       HARINI EASLEY                   Room:           [address] [place]  
..#:  [id number]      Account #:      [id number]  
Admission:  20     Attend Phys:    Rachana Hamilton MD 
Discharge:  20     Date of Birth:  [birth year]  
         Report #: [id number]
                                                                     [id number]
_______________________________________________________________________________
@21:44GCS: 9,
@22:35GCS: 15,
@22:10
@21:44Glucose: 215,
@22:10Glucose: 260,
 
Assessments
@21:44MENTAL:SKIN:HEENT:LUNG 
SOUNDS:ABDOMEN:PELVIS//GI:EXTREMITIES:PULSE:NEURO: 
 
Impression
Acute Respiratory Distress (Dyspnea)
 
Procedures
@21:49Naloxone - 1.000 Milligrams (mg) - IntranasalResponse: 
Improved@21:50Other - Medication - 12.000 Liters per Minute (l/min [fluid]) - 
Non-Rebreather MaskResponse: Improved@22:28Other - Medication - 12.000 Liters 
per Minute (l/min [fluid]) - InhalationResponse: Improved@22:28Albuterol - 
2.500 Milligrams (mg) - InhalationResponse: Improved@22:28Ipratropium - 0.500 
Milligrams (mg) - InhalationResponse: Improved@22:37Naloxone - 1.000 Milligrams 
(mg) - Intravenous (IV)Response: Unchanged@22:10 cc () Site: 
Antecubital-LeftResponse: UnchangedFailed@22:14 cc () Site: Hand-RightResponse: 
UnchangedFailed@22:20 cc () Site: Other Peripheral (Not Listed)Response: 
UnchangedSucceeded@22:03Digital respired carbon dioxide monitoring 
(regime/therapy)Response: UnchangedSucceeded@22:05Digital respired carbon 
dioxide monitoring (regime/therapy)Response: UnchangedSucceeded@22:10Digital 
respired carbon dioxide monitoring (regime/therapy)Response: 
UnchangedSucceeded@22:12Digital respired carbon dioxide monitoring 
(regime/therapy)Response: UnchangedSucceeded@22:17Digital respired carbon 
dioxide monitoring (regime/therapy)Response: UnchangedSucceeded@22:22Digital 
respired carbon dioxide monitoring (regime/therapy)Response: 
UnchangedSucceeded@22:22Digital respired carbon dioxide monitoring 
(regime/therapy)Response: UnchangedSucceeded@22:27Digital respired carbon 
dioxide monitoring (regime/therapy)Response: UnchangedSucceeded@22:32Digital 
respired carbon dioxide monitoring (regime/therapy)Response: 
UnchangedSucceeded@21:553-Lead ECGResponse: UnchangedSucceeded@22:0312-Lead 
ECGResponse: UnchangedSucceeded@22:0412-Lead ECGResponse: 
UnchangedSucceeded@22:00Isolation precautions (procedure)Response: 
UnchangedSucceeded 
 
Timeline
21:36,Call Received
21:36,Dispatch Notified
21:36,Psap Call
21:36,Dispatched
21:39,En Route
 
 
 
[place] [address]                    EMS Patient Care Report       
_______________________________________________________________________________
 
Name:       HARINI EASLEY                   Room:           M.212-P    DIS IN  
M.R.#:  [id number]      Account #:      [id number]  
Admission:  20     Attend Phys:    Rachana Hamilton MD 
Discharge:  20     Date of Birth:  [birth year]  
         Report #: [id number]
                                                                     [id number]
_______________________________________________________________________________
21:42,On Scene
21:44,At Patient
21:44,BP: 116/50 M,PULSE: 74,RR: 24 R,SPO2:  Ox,ETCO2:  ,BG: ,PAIN: ,GCS: ,
21:44,BP: / M,PULSE: ,RR:  R,SPO2:  Ox,ETCO2:  ,BG: ,PAIN: ,GCS: 9,
21:44,BP: / M,PULSE: ,RR:  R,SPO2:  Ox,ETCO2:  ,B,PAIN: ,GCS: ,
21:47,BP: / M,PULSE: ,RR:  R,SPO2: 65 Ox,ETCO2:  ,BG: ,PAIN: ,GCS: ,
21:49,Naloxone - 1.000 Milligrams (mg) - Intranasal,Response: Improved
21:50,Other - Medication - 12.000 Liters per Minute (l/min [fluid]) - 
Non-Rebreather Mask,Response: Improved 
21:55,3-Lead ECG,Response: UnchangedSucceeded,
21:55,BP: / M,PULSE: ,RR:  R,SPO2:  Ox,ETCO2:  ,BG: ,PAIN: ,GCS: ,
21:58,BP: / M,PULSE: ,RR:  R,SPO2: 97 Ox,ETCO2:  ,BG: ,PAIN: ,GCS: ,
22:00,Isolation precautions (procedure),Response: UnchangedSucceeded,
22:00,BP: / M,PULSE: ,RR:  R,SPO2: 82 Ox,ETCO2:  ,BG: ,PAIN: ,GCS: ,
22:03,Digital respired carbon dioxide monitoring (regime/therapy),Response: 
UnchangedSucceeded, 
22:03,12-Lead ECG,Response: UnchangedSucceeded,
22:03,BP: / M,PULSE: ,RR:  R,SPO2: 84 Ox,ETCO2:  ,BG: ,PAIN: ,GCS: ,
22:03,BP: / M,PULSE: ,RR:  R,SPO2:  Ox,ETCO2:  ,BG: ,PAIN: ,GCS: ,
22:03,BP: / M,PULSE: ,RR:  R,SPO2:  Ox,ETCO2: 52 ,BG: ,PAIN: ,GCS: ,
22:04,12-Lead ECG,Response: UnchangedSucceeded,
22:04,BP: / M,PULSE: ,RR:  R,SPO2:  Ox,ETCO2:  ,BG: ,PAIN: ,GCS: ,
22:05,Digital respired carbon dioxide monitoring (regime/therapy),Response: 
UnchangedSucceeded, 
22:05,BP: / M,PULSE: ,RR:  R,SPO2: 85 Ox,ETCO2:  ,BG: ,PAIN: ,GCS: ,
22:05,BP: / M,PULSE: ,RR:  R,SPO2:  Ox,ETCO2: 67 ,BG: ,PAIN: ,GCS: ,
22:10, cc  Site: Antecubital-Left,Response: UnchangedFailed,
22:10,Digital respired carbon dioxide monitoring (regime/therapy),Response: 
UnchangedSucceeded, 
22:10,BP: / M,PULSE: ,RR:  R,SPO2: 84 Ox,ETCO2:  ,BG: ,PAIN: ,GCS: ,
22:10,BP: / M,PULSE: ,RR:  R,SPO2:  Ox,ETCO2: 49 ,BG: ,PAIN: ,GCS: ,
22:10,BP: / M,PULSE: ,RR:  R,SPO2:  Ox,ETCO2:  ,BG: ,PAIN: ,GCS: ,
22:10,BP: / M,PULSE: ,RR:  R,SPO2:  Ox,ETCO2:  ,B,PAIN: ,GCS: ,
22:12,Digital respired carbon dioxide monitoring (regime/therapy),Response: 
UnchangedSucceeded, 
22:12,BP: / M,PULSE: ,RR:  R,SPO2:  Ox,ETCO2: 61 ,BG: ,PAIN: ,GCS: ,
22:12,Depart Scene
22:12,BP: / M,PULSE: ,RR:  R,SPO2:  Ox,ETCO2:  ,BG: ,PAIN: ,GCS: ,
22:14, cc  Site: Hand-Right,Response: UnchangedFailed,
22:17,Digital respired carbon dioxide monitoring (regime/therapy),Response: 
UnchangedSucceeded, 
22:17,BP: / M,PULSE: ,RR:  R,SPO2:  Ox,ETCO2: 64 ,BG: ,PAIN: ,GCS: ,
22:20, cc  Site: Other Peripheral (Not Listed),Response: UnchangedSucceeded,
22:22,Digital respired carbon dioxide monitoring (regime/therapy),Response: 
UnchangedSucceeded, 
22:22,Digital respired carbon dioxide monitoring (regime/therapy),Response: 
 
 
 
[facility] 
[address] [place]  [address]                    EMS Patient Care Report       
_______________________________________________________________________________
 
Name:       HARINI EASLEY                   Room:           M.212-P    DIS IN  
M.R.#:  [id number]      Account #:      [id number]  
Admission:  20     Attend Phys:    Rachana Hamilton MD 
Discharge:  20     Date of Birth:  [birth year]  
         Report #: [id number]
                                                                     [id number]
_______________________________________________________________________________
UnchangedSucceeded, 
22:22,BP: / M,PULSE: ,RR:  R,SPO2:  Ox,ETCO2: 57 ,BG: ,PAIN: ,GCS: ,
22:22,BP: / M,PULSE: ,RR:  R,SPO2:  Ox,ETCO2: 57 ,BG: ,PAIN: ,GCS: ,
22:27,Digital respired carbon dioxide monitoring (regime/therapy),Response: 
UnchangedSucceeded, 
22:27,BP: / M,PULSE: ,RR:  R,SPO2: 78 Ox,ETCO2:  ,BG: ,PAIN: ,GCS: ,
22:27,BP: / M,PULSE: ,RR:  R,SPO2:  Ox,ETCO2: 62 ,BG: ,PAIN: ,GCS: ,
22:28,Other - Medication - 12.000 Liters per Minute (l/min [fluid]) - 
Inhalation,Response: Improved 
22:28,Albuterol - 2.500 Milligrams (mg) - Inhalation,Response: Improved
22:28,Ipratropium - 0.500 Milligrams (mg) - Inhalation,Response: Improved
22:32,Digital respired carbon dioxide monitoring (regime/therapy),Response: 
UnchangedSucceeded, 
22:32,BP: / M,PULSE: ,RR:  R,SPO2:  Ox,ETCO2: 66 ,BG: ,PAIN: ,GCS: ,
22:35,BP: 124/68 M,PULSE: 89,RR: 24 R,SPO2:  Ox,ETCO2:  ,BG: ,PAIN: ,GCS: ,
22:35,BP: / M,PULSE: ,RR:  R,SPO2:  Ox,ETCO2:  ,BG: ,PAIN: ,GCS: 15,
22:37,Naloxone - 1.000 Milligrams (mg) - Intravenous (IV),Response: Unchanged
22:38,At Destination
22:56,Call Closed
 
Disclaimer
v1.1     Copyright 2020 [employer]
This EMS Care Summary contains data elements from the applicable legal record 
(which may be displayed differently). It is designed to provide pertinent 
information for the following purposes: continuity of care, clinical quality, 
and state data reporting. The complete legal record is available to ED staff 
and administrators of the receiving hospital in ES's Patient Tracker. All data 
is provided "as is."

## 2020-12-21 VITALS — SYSTOLIC BLOOD PRESSURE: 113 MMHG | DIASTOLIC BLOOD PRESSURE: 64 MMHG

## 2020-12-21 VITALS — DIASTOLIC BLOOD PRESSURE: 61 MMHG | SYSTOLIC BLOOD PRESSURE: 119 MMHG

## 2020-12-21 VITALS — DIASTOLIC BLOOD PRESSURE: 39 MMHG | SYSTOLIC BLOOD PRESSURE: 98 MMHG

## 2020-12-21 VITALS — SYSTOLIC BLOOD PRESSURE: 132 MMHG | DIASTOLIC BLOOD PRESSURE: 72 MMHG

## 2020-12-21 VITALS — SYSTOLIC BLOOD PRESSURE: 104 MMHG | DIASTOLIC BLOOD PRESSURE: 72 MMHG

## 2020-12-21 VITALS — DIASTOLIC BLOOD PRESSURE: 58 MMHG | SYSTOLIC BLOOD PRESSURE: 113 MMHG

## 2020-12-21 VITALS — SYSTOLIC BLOOD PRESSURE: 115 MMHG | DIASTOLIC BLOOD PRESSURE: 59 MMHG

## 2020-12-21 VITALS — DIASTOLIC BLOOD PRESSURE: 62 MMHG | SYSTOLIC BLOOD PRESSURE: 120 MMHG

## 2020-12-21 VITALS — SYSTOLIC BLOOD PRESSURE: 109 MMHG | DIASTOLIC BLOOD PRESSURE: 58 MMHG

## 2020-12-21 VITALS — SYSTOLIC BLOOD PRESSURE: 119 MMHG | DIASTOLIC BLOOD PRESSURE: 68 MMHG

## 2020-12-21 VITALS — SYSTOLIC BLOOD PRESSURE: 155 MMHG | DIASTOLIC BLOOD PRESSURE: 87 MMHG

## 2020-12-21 VITALS — DIASTOLIC BLOOD PRESSURE: 49 MMHG | SYSTOLIC BLOOD PRESSURE: 97 MMHG

## 2020-12-21 VITALS — SYSTOLIC BLOOD PRESSURE: 119 MMHG | DIASTOLIC BLOOD PRESSURE: 63 MMHG

## 2020-12-21 VITALS — SYSTOLIC BLOOD PRESSURE: 127 MMHG | DIASTOLIC BLOOD PRESSURE: 60 MMHG

## 2020-12-21 VITALS — DIASTOLIC BLOOD PRESSURE: 39 MMHG | SYSTOLIC BLOOD PRESSURE: 81 MMHG

## 2020-12-21 VITALS — DIASTOLIC BLOOD PRESSURE: 53 MMHG | SYSTOLIC BLOOD PRESSURE: 119 MMHG

## 2020-12-21 VITALS — DIASTOLIC BLOOD PRESSURE: 63 MMHG | SYSTOLIC BLOOD PRESSURE: 128 MMHG

## 2020-12-21 LAB
BE: 4.6 MMOL/L
BENZODIAZ UR-MCNC: POSITIVE UG/L
BILIRUB UR-MCNC: NEGATIVE MG/DL
COLOR UR: YELLOW
INR PPP: 1
KETONES UR STRIP-MCNC: NEGATIVE MG/DL
OPIATES UR-MCNC: POSITIVE NG/ML
PCO2 BLD: 129 MMHG (ref 35–45)
PO2 BLD: 137.9 MMHG (ref 75–100)
PROT UR QL STRIP: NEGATIVE
PROTHROMBIN TIME: 9.9 SECONDS (ref 9.2–11.5)
RBC # UR STRIP: NEGATIVE /UL
SP GR UR STRIP: 1.02 (ref 1–1.03)
URINE CLARITY: CLEAR
URINE GLUCOSE-RANDOM: (no result)
URINE LEUKOCYTES-REFLEX: NEGATIVE
URINE NITRITE-REFLEX: NEGATIVE
UROBILINOGEN UR STRIP-ACNC: 0.2 E.U./DL (ref 0.2–1)

## 2020-12-21 NOTE — NUR
PATIENT GIVEN 2 OF NARCAN VIA IV AT APPROX 0200.  AT THIS TIME, ALERT AND
ORIENTED TIMES FOUR.  ASKING FOR A TV IN HER ROOM. SR CATHETER PLACED

## 2020-12-21 NOTE — 2DMMODE
[place] [address]
Phone:  [phone] 2 D/M-MODE ECHOCARDIOGRAM     
_______________________________________________________________________________
 
Name:         TRACEEHARINI                    Room:          [address]    ADM IN 
OSVALDO.#:    [id number]     Account #:     [id number]  
Admission:    20    Attend Phys:   Rcahana Hamilton, 
Discharge:                Date of Birth: [birth year]  
Date of Service: 20 1355  Report #:      [id number]
        [id number]
_______________________________________________________________________________
THIS REPORT FOR:
 
cc:  FAM - No family physician/PCP 
     FAM - No family physician/PCP 
     Matias Cruz MD [facility]        
                                                                       ~
 
--------------- APPROVED REPORT --------------
 
 
Study performed:  2020 10:32:42
 
EXAM: Comprehensive 2D, Doppler, and color-flow 
Echocardiogram 
Patient Location: In-Patient   
Room #:  004     Status:  routine
 
      BSA:         2.33
HR: 114 bpm BP:          127/60 mmHg 
Rhythm: NSR     
 
Other Information 
Study Quality: Good
 
Indications
opiate overdose
resp failure
 
2D Dimensions
IVSd:  10.83 (7-11mm) LVOT Diam:  20.83 (18-24mm) 
LVDd:  51.73 mm  
PWd:  9.18 (7-11mm) Ascending Ao:  31.00 (22-36mm)
LVDs:  31.13 (25-40mm) 
Aortic Root:  33.23 mm 
 
Volumes
Left Atrial Volume (Systole) 
    LA ESV Index:  14.70 mL/m2
 
Aortic Valve
AoV Peak Deo.:  2.38 m/s 
AO Peak Gr.:  22.64 mmHg LVOT Max P.04 mmHg
AO Mean Gr.:  12.40 mmHg LVOT Mean P.68 mmHg
    LVOT Max V:  1.50 m/s
AO V2 VTI:  28.30 cm  LVOT Mean V:  1.13 m/s
BISMARK (VTI):  2.26 cm2  LVOT V1 VTI:  18.75 cm
 
 
[place] [address]
Phone:  [phone]                     2 D/M-MODE ECHOCARDIOGRAM     
_______________________________________________________________________________
 
Name:         HARINI EASLEY                    Room:          [address] [place] 
[facility].#:    [id number]     Account #:     [id number]  
Admission:    20    Attend Phys:   Rachana Hamilton, 
Discharge:                Date of Birth: [birth year]  
Date of Service: 20 1355  Report #:      [id number]
        [id number]
_______________________________________________________________________________
 
Mitral Valve
    E/A Ratio:  0.83
    MV Decel. Time:  97.72 ms
MV E Max Deo.:  1.00 m/s 
MV PHT:  28.34 ms  
MVA (PHT):  7.76 cm2  
 
TDI
E/Lateral E':  7.69 
   Lateral E' Deo.:  0.13 m/s
 
Pulmonary Valve
PV Peak Deo.:  1.74 m/s PV Peak Gr.:  12.05 mmHg
 
Tricuspid Valve
    RAP Estimate:  5.00 mmHg
TR Peak Gr.:  37.11 mmHg RVSP:  42.00 mmHg
    PA Pressure:  42.00 mmHg
 
Left Ventricle
The left ventricle is normal size. There is normal LV segmental wall 
motion. There is normal left ventricular wall thickness. Left 
ventricular systolic function is normal. The left ventricular 
ejection fraction is within the normal range. LVEF is 60-65%. Grade I 
- abnormal relaxation pattern.
 
Right Ventricle
The right ventricle is normal size. The right ventricular systolic 
function is normal.
 
Atria
The left atrium size is normal. The right atrium size is 
normal.
 
Aortic Valve
The aortic valve is normal in structure. No aortic regurgitation is 
present. There is no aortic valvular stenosis.
 
Mitral Valve
The mitral valve is normal in structure. There is no mitral valve 
regurgitation noted. No evidence of mitral valve stenosis.
 
Tricuspid Valve
The tricuspid valve is normal in structure. Trace tricuspid 
regurgitation. Moderate pulmonary hypertension.
 
 
[place] [address]
Phone:  [phone]                     2 D/M-MODE ECHOCARDIOGRAM     
_______________________________________________________________________________
 
Name:         HARINI EASLEY                    Room:          [address] [place] 
[facility]#:    [id number]     Account #:     [id number]  
Admission:    20    Attend Phys:   Rachana Hamilton, 
Discharge:                Date of Birth: [birth year]  
Date of Service: 20 1355  Report #:      [id number]
        [id number]
_______________________________________________________________________________
 
Pulmonic Valve
The pulmonary valve is normal in structure. There is no pulmonic 
valvular regurgitation.
 
Great Vessels
The aortic root is normal in size. IVC is normal in size and 
collapses >50% with inspiration.
 
Pericardium
There is no pericardial effusion.
 
<Conclusion>
Left ventricular systolic function is normal.
The left ventricular ejection fraction is within the normal range.
 
 
 
 
 
 
 
 
 
 
 
 
 
 
 
 
 
 
 
 
 
 
 
 
 
 
 
 
 
  <ELECTRONICALLY SIGNED>
                                           By: Matias Cruz MD, FACC      
  20     1355
D: 20 1355   _____________________________________
T: 20 1355   Matias Cruz MD, FACC        /INF

## 2020-12-21 NOTE — NUR
ICU rounds: On bipap, pulm following. Pt known to this CM from previous
hospital stay. Resides at home with her BF and son, son assist with cares and
is paid through Pt's [place] [employer] for 4hrs/day,7 days/week. Pt has a cane and walker
for mobility. Uses home o2 continuous at 4L and has a trilogy. Hx of [facility]. No hx of SNF. Following.

## 2020-12-21 NOTE — NUR
Nutrition: BMI 46.4. Pt admitted early this am for OD, respiratory failure.
Steriods and other meds reviewed.  BG in 300's.  K and albumin low, CO2 high.
Pt to start heart healthy diet at lunch today.  Pt was educated on DM diet in
November 2020; wt up from that admit.  Assess at low-mild nutrition risk at
this time.  Rec add CHO controlled diet to current order.

## 2020-12-21 NOTE — EKG
[place] [address]
Phone:  [phone]                     ELECTROCARDIOGRAM REPORT      
_______________________________________________________________________________
 
Name:         HARINI EASLEY                    Room:          [address]    ADM IN 
.R.#:    [id number]     Account #:     [id number]  
Admission:    20    Attend Phys:   Rachana Hamilton, 
Discharge:                Date of Birth: [birth year]  
Date of Service: 20 2247  Report #:      [id number]
        [id number]
_______________________________________________________________________________
THIS REPORT FOR:  //name//                      
 
                         [facility] ED
                                       
Test Date:    2020               Test Time:    22:47:30
Pat Name:     HARINI EASLEY               Department:   
Patient ID:   [id number]            Room:         [facility]
Gender:       F                        Technician:   LESLIE
:          1970               Requested By: Windy Arriaga
Order Number: [id number] MD:   Matias Cruz
                                 Measurements
Intervals                              Axis          
Rate:         116                      P:            24
OR:           96                       QRS:          53
QRSD:         104                      T:            73
QT:           445                                    
QTc:          619                                    
                           Interpretive Statements
Sinus tachycardia
Low voltage, precordial leads
Abnormal inferior Q waves
Borderline T wave abnormalities
Prolonged QT interval
Baseline wander in lead(s) V5
Compared to ECG 2020 23:15:03
T-wave abnormality still present
Electronically Signed On 2020 10:27:31 CST by Matias Cruz
[email or web address]
 
 
 
 
 
 
 
 
 
 
 
 
 
 
 
 
  <ELECTRONICALLY SIGNED>
                                           By: Matias Cruz MD, FACC      
  20     1027
D: 20   _____________________________________
T: 20   Matias Cruz MD, [facility]        /EPI

## 2020-12-21 NOTE — NUR
PATIENTS CHART REVIEWED FOR CONTRAINDICATIONS.  PT, INR AND PLATELETS WITHIN
SAFE LEVELS.  ULTRASOUND EVALUATION OF RIGHT AND LEFT LUNG BASES SHOW NO SIGN
OF PNEUMOTHORAX, PATIENT IN NO RESPIRATORY DISTRESS AND 02 SAT ON 2 LITERS OF
N/C= 97%.  RIGHT INTERNAL JUGULAR VESSEL OBSERVED PATENT AND COMPRESSIBLE TO
THE INOMINATE JUNCTION. [facility].  PATIENT
PREPPED AND DRAPED WITH ALL INCLUSIVE SERILE J.A.C.C. KIT.  1% LIDOCAINE 1ML
SUB-Q GIVEN AT INSERTION SITE.  RIGHT IJ CANNULATED WITH #20 JEYSON NEEDLE AND
GUIDEWIRE PASSED FREELY WITH NO RESISTANCE INTO THE VESSEL.  MICRODUCER
INCERTED, INNERCANNULA REMOVED AND CENTRAL LINE CATHETER PASSED INTO
INTRODUCER WITH NO RESISTANCE MET.  LINE FLUSHED WITH GOOD BRISK BLOOD RETURN
NOTED,  SECURED WITH STATLOCK AND BIOOCCLUSIVE DRESSING APPLIED.
POST-PROCEDURE CXR SHOWS LINE TERMINATES IN THE RIGHT VENTRICLE.  LINE PULLED
BACK 5CM AND RESECURED WITH STERILE DRESSING, CHEST X-RAY SHOWS TIP
TERMINATION AT THE CAVOATRIAL JUNCTION.  PATIENT TOLERATED PROCEDURE WELL, 02
SATS REMAIN 97% WITH NO RESPIRATORY DISTRESS.  REPORT GIVEN TO CHELSEA MOLINA.

## 2020-12-21 NOTE — NUR
RIGHT BASILIC VESSEL ACCESSED FOR 5 [place] TRIPLE LUMEN PICC BUT UNABLE TO
ADVANCE THE GUIDE WIRE PAST THE AXILLA.  NEEDLE WITHDRAWN AND PRESSURE HELD
FOR 5 MINUTES WITH GAUZE AND TAPE.  LEFT CHEPHALIC VESSEL ACCESSED FOR TRIPLE
LUMEN PICC.  LINE PRE-TRIMMED TO 40CM AND ADVANCED TO THE ZERO YA WITH NO
RESISTANCE MET.  UPPER ARM CIRCUMFERENCE ABOVE INSERTION SITE=20".  SHERLOCK
MAGNET AND 3CG CONFIRMATION OF TIP TERMINATION AT THE CAVOATRIAL JUNCTION
APPRECIATED.  GUIDEWIRE REMOIVED, LINE FLUSHED AND INSERTION SITE DRESSED.
REPORT GIVEN TO CHELSEA MOLINA.

## 2020-12-22 VITALS — SYSTOLIC BLOOD PRESSURE: 119 MMHG | DIASTOLIC BLOOD PRESSURE: 53 MMHG

## 2020-12-22 VITALS — SYSTOLIC BLOOD PRESSURE: 121 MMHG | DIASTOLIC BLOOD PRESSURE: 59 MMHG

## 2020-12-22 VITALS — SYSTOLIC BLOOD PRESSURE: 115 MMHG | DIASTOLIC BLOOD PRESSURE: 58 MMHG

## 2020-12-22 VITALS — SYSTOLIC BLOOD PRESSURE: 163 MMHG | DIASTOLIC BLOOD PRESSURE: 97 MMHG

## 2020-12-22 VITALS — SYSTOLIC BLOOD PRESSURE: 128 MMHG | DIASTOLIC BLOOD PRESSURE: 57 MMHG

## 2020-12-22 VITALS — DIASTOLIC BLOOD PRESSURE: 56 MMHG | SYSTOLIC BLOOD PRESSURE: 137 MMHG

## 2020-12-22 VITALS — DIASTOLIC BLOOD PRESSURE: 95 MMHG | SYSTOLIC BLOOD PRESSURE: 153 MMHG

## 2020-12-22 VITALS — SYSTOLIC BLOOD PRESSURE: 139 MMHG | DIASTOLIC BLOOD PRESSURE: 69 MMHG

## 2020-12-22 VITALS — DIASTOLIC BLOOD PRESSURE: 65 MMHG | SYSTOLIC BLOOD PRESSURE: 124 MMHG

## 2020-12-22 VITALS — SYSTOLIC BLOOD PRESSURE: 112 MMHG | DIASTOLIC BLOOD PRESSURE: 50 MMHG

## 2020-12-22 VITALS — SYSTOLIC BLOOD PRESSURE: 139 MMHG | DIASTOLIC BLOOD PRESSURE: 76 MMHG

## 2020-12-22 LAB
ABSOLUTE MONOCYTES: 0.5 THOU/UL (ref 0–1.2)
ANION GAP SERPL CALC-SCNC: 0 MMOL/L (ref 7–16)
ANISOCYTOSIS BLD QL SMEAR: (no result)
BUN SERPL-MCNC: 17 MG/DL (ref 7–18)
CALCIUM SERPL-MCNC: 9.2 MG/DL (ref 8.5–10.1)
CHLORIDE SERPL-SCNC: 99 MMOL/L (ref 98–107)
CO2 SERPL-SCNC: 37 MMOL/L (ref 21–32)
CREAT SERPL-MCNC: 0.8 MG/DL (ref 0.6–1.3)
GLUCOSE SERPL-MCNC: 367 MG/DL (ref 70–99)
GRANULOCYTES NFR BLD MANUAL: 92 %
HCT VFR BLD CALC: 32.5 % (ref 37–47)
HGB BLD-MCNC: 10.3 GM/DL (ref 12–15)
LYMPHOCYTES # BLD: 0.5 THOU/UL (ref 0.8–5.3)
LYMPHOCYTES NFR BLD AUTO: 4 %
MCH RBC QN AUTO: 28.3 PG (ref 26–34)
MCHC RBC AUTO-ENTMCNC: 31.7 G/DL (ref 28–37)
MCV RBC: 89.2 FL (ref 80–100)
MONOCYTES NFR BLD: 4 %
MPV: 7 FL. (ref 7.2–11.1)
NEUTROPHILS # BLD: 12.1 THOU/UL (ref 1.6–8.1)
NUCLEATED RBCS: 0 /100WBC
PLATELET # BLD EST: ADEQUATE 10*3/UL
PLATELET COUNT*: 345 THOU/UL (ref 150–400)
POIKILOCYTOSIS BLD QL SMEAR: (no result)
POTASSIUM SERPL-SCNC: 5.1 MMOL/L (ref 3.5–5.1)
RBC # BLD AUTO: 3.65 MIL/UL (ref 4.2–5)
RDW-CV: 14.7 % (ref 10.5–14.5)
SODIUM SERPL-SCNC: 136 MMOL/L (ref 136–145)
WBC # BLD AUTO: 13.1 THOU/UL (ref 4–11)

## 2020-12-22 NOTE — NUR
pt transfered to room 212 via wheelchair with nursing staff all belongings
packed and sent with pt home medication are in pharmacy

## 2020-12-23 VITALS — DIASTOLIC BLOOD PRESSURE: 73 MMHG | SYSTOLIC BLOOD PRESSURE: 157 MMHG

## 2020-12-23 VITALS — SYSTOLIC BLOOD PRESSURE: 133 MMHG | DIASTOLIC BLOOD PRESSURE: 82 MMHG

## 2020-12-23 VITALS — DIASTOLIC BLOOD PRESSURE: 42 MMHG | SYSTOLIC BLOOD PRESSURE: 136 MMHG

## 2020-12-23 VITALS — DIASTOLIC BLOOD PRESSURE: 76 MMHG | SYSTOLIC BLOOD PRESSURE: 151 MMHG

## 2020-12-23 VITALS — DIASTOLIC BLOOD PRESSURE: 64 MMHG | SYSTOLIC BLOOD PRESSURE: 135 MMHG

## 2020-12-23 VITALS — DIASTOLIC BLOOD PRESSURE: 67 MMHG | SYSTOLIC BLOOD PRESSURE: 152 MMHG

## 2020-12-23 LAB
ABSOLUTE BASOPHILS: 0 THOU/UL (ref 0–0.2)
ABSOLUTE EOSINOPHILS: 0 THOU/UL (ref 0–0.7)
ABSOLUTE MONOCYTES: 0.5 THOU/UL (ref 0–1.2)
ALBUMIN SERPL-MCNC: 3 G/DL (ref 3.4–5)
ALP SERPL-CCNC: 65 U/L (ref 46–116)
ALT SERPL-CCNC: 38 U/L (ref 30–65)
ANION GAP SERPL CALC-SCNC: 4 MMOL/L (ref 7–16)
AST SERPL-CCNC: 17 U/L (ref 15–37)
BASOPHILS NFR BLD AUTO: 0.1 %
BILIRUB SERPL-MCNC: 0.1 MG/DL
BUN SERPL-MCNC: 21 MG/DL (ref 7–18)
CALCIUM SERPL-MCNC: 9.5 MG/DL (ref 8.5–10.1)
CHLORIDE SERPL-SCNC: 102 MMOL/L (ref 98–107)
CO2 SERPL-SCNC: 33 MMOL/L (ref 21–32)
CREAT SERPL-MCNC: 0.8 MG/DL (ref 0.6–1.3)
EOSINOPHIL NFR BLD: 0 %
GLUCOSE SERPL-MCNC: 368 MG/DL (ref 70–99)
GRANULOCYTES NFR BLD MANUAL: 89.2 %
HCT VFR BLD CALC: 31.9 % (ref 37–47)
HGB BLD-MCNC: 10.5 GM/DL (ref 12–15)
LYMPHOCYTES # BLD: 0.8 THOU/UL (ref 0.8–5.3)
LYMPHOCYTES NFR BLD AUTO: 6.7 %
MCH RBC QN AUTO: 29.1 PG (ref 26–34)
MCHC RBC AUTO-ENTMCNC: 32.8 G/DL (ref 28–37)
MCV RBC: 88.7 FL (ref 80–100)
MONOCYTES NFR BLD: 4 %
MPV: 7.1 FL. (ref 7.2–11.1)
NEUTROPHILS # BLD: 11 THOU/UL (ref 1.6–8.1)
NUCLEATED RBCS: 0 /100WBC
PLATELET COUNT*: 348 THOU/UL (ref 150–400)
POTASSIUM SERPL-SCNC: 5.1 MMOL/L (ref 3.5–5.1)
PROT SERPL-MCNC: 6.4 G/DL (ref 6.4–8.2)
RBC # BLD AUTO: 3.6 MIL/UL (ref 4.2–5)
RDW-CV: 14.6 % (ref 10.5–14.5)
SODIUM SERPL-SCNC: 139 MMOL/L (ref 136–145)
WBC # BLD AUTO: 12.3 THOU/UL (ref 4–11)

## 2020-12-23 NOTE — NUR
RECEIVED REPORT. ASSUMED CARE OF PT AROUND 0730. AM ASSESSMENT AND VITALS
COMPLETED AS CHARTED. MEDS PER EMAR. PT UP AD BRIANNE TO BEDSIDE CHAIR AND
BATHROOM. PT WITH NAUSEA X 1 THIS SHIFT, RELIEVED WITH ZOFRAN. PLAN IS FOR PT
TO DC TOMORROW. PT TOLERATING DIET, INSULIN ADJUSTED. PT CURRENTLY WATCHING TV
IN BED. CALL LIGHT IS WITHIN REACH. HOURLY ROUNDING PERFORMED. LOW FALL RISK
PRECAUTIONS IN PLACE.

## 2020-12-23 NOTE — NUR
PT ALERT ORIENTED. UP AD BRIANNE IN ROOM. O2 AT 4 LITERS ALSO HOME DOSE. BIPAP AT
HS. PT TOLERATED UNTIL ABOUT 0300 THEN WANTED IT REMOVED. HEAD ACHE AT HS.
TYLENOL GIVEN. WCTM

## 2020-12-24 VITALS — DIASTOLIC BLOOD PRESSURE: 82 MMHG | SYSTOLIC BLOOD PRESSURE: 133 MMHG

## 2020-12-24 VITALS — DIASTOLIC BLOOD PRESSURE: 59 MMHG | SYSTOLIC BLOOD PRESSURE: 132 MMHG

## 2020-12-24 VITALS — DIASTOLIC BLOOD PRESSURE: 79 MMHG | SYSTOLIC BLOOD PRESSURE: 151 MMHG

## 2020-12-24 VITALS — SYSTOLIC BLOOD PRESSURE: 151 MMHG | DIASTOLIC BLOOD PRESSURE: 78 MMHG

## 2020-12-24 VITALS — SYSTOLIC BLOOD PRESSURE: 132 MMHG | DIASTOLIC BLOOD PRESSURE: 59 MMHG

## 2020-12-24 VITALS — SYSTOLIC BLOOD PRESSURE: 133 MMHG | DIASTOLIC BLOOD PRESSURE: 82 MMHG

## 2020-12-24 LAB
ABSOLUTE BASOPHILS: 0 THOU/UL (ref 0–0.2)
ABSOLUTE EOSINOPHILS: 0 THOU/UL (ref 0–0.7)
ABSOLUTE MONOCYTES: 0.3 THOU/UL (ref 0–1.2)
ANION GAP SERPL CALC-SCNC: 3 MMOL/L (ref 7–16)
BASOPHILS NFR BLD AUTO: 0.1 %
BUN SERPL-MCNC: 22 MG/DL (ref 7–18)
CALCIUM SERPL-MCNC: 9.6 MG/DL (ref 8.5–10.1)
CHLORIDE SERPL-SCNC: 103 MMOL/L (ref 98–107)
CO2 SERPL-SCNC: 33 MMOL/L (ref 21–32)
CREAT SERPL-MCNC: 0.7 MG/DL (ref 0.6–1.3)
EOSINOPHIL NFR BLD: 0 %
GLUCOSE SERPL-MCNC: 337 MG/DL (ref 70–99)
GRANULOCYTES NFR BLD MANUAL: 87.3 %
HCT VFR BLD CALC: 32.6 % (ref 37–47)
HGB BLD-MCNC: 10.4 GM/DL (ref 12–15)
LYMPHOCYTES # BLD: 0.9 THOU/UL (ref 0.8–5.3)
LYMPHOCYTES NFR BLD AUTO: 9 %
MCH RBC QN AUTO: 28.5 PG (ref 26–34)
MCHC RBC AUTO-ENTMCNC: 32 G/DL (ref 28–37)
MCV RBC: 89.2 FL (ref 80–100)
MONOCYTES NFR BLD: 3.6 %
MPV: 6.9 FL. (ref 7.2–11.1)
NEUTROPHILS # BLD: 8.3 THOU/UL (ref 1.6–8.1)
NUCLEATED RBCS: 0 /100WBC
PLATELET COUNT*: 333 THOU/UL (ref 150–400)
POTASSIUM SERPL-SCNC: 5.1 MMOL/L (ref 3.5–5.1)
RBC # BLD AUTO: 3.65 MIL/UL (ref 4.2–5)
RDW-CV: 14.5 % (ref 10.5–14.5)
SODIUM SERPL-SCNC: 139 MMOL/L (ref 136–145)
WBC # BLD AUTO: 9.5 THOU/UL (ref 4–11)

## 2020-12-24 NOTE — NUR
Around 2330 hrs pt declined to wear BIPAP stating her neck hurts and that
she's clautrophobic. Indications and contraindications explained to patient,
verbalizes understanding.

## 2020-12-24 NOTE — NUR
ASSUMED CARE OF PT THIS AM AROUND 0715- CARDIAC MONITOR IN PLACE AS ORDERED,
TRACING SR- UPON ASSESSMENT PT NOTED TO BE RESTING IN BED- PT A&O X4- CONT OF
B/B- UP AD-BRIANNE IN ROOM, STEADY GAIT NOTED- DIMINISHED LUNG SOUNDS NOTED-
REPORTED NON-PRODUCTIVE COUGH- SOA WITH EXERTION- VSS, O2 SAT 95% ON RA- ABD
SOFT/OBESE/NON-TENDER, BS X4 QUADS- LAST BM REPORTED THIS AM- RIGHT IJ NOTED
C/D/I, SL; IV ABT GIVEN THIS AM AS PRESCRIBED- GOOD PO INTAKE NOTED THIS AM
WITH BREAKFAST, BS MONITORED WITH SSI AND SCHEDULED INSULIN GIVEN THIS AM-
CALL LIGHT AND PERSONAL BELONGINGS WITH IN REACH- PT MAKES NEEDS KNOWN- ALL
NEEDS MET AT THIS TIME-WCTM

## 2020-12-24 NOTE — NUR
ASSUMED PT CARE AT APPROX 1930. PT IS AWAKE AND ORIENTED X4. PT IS NOT IN
DISTRESS, NO DESATURATIONS NOTED ON 4L OF O2/NC. PT IS TRACING SR ON THE
CARDIAC MONITOR. NO ACUTE CHANGES THIS SHIFT. CALL LIGHT WITHIN REACH. HOURLY
ROUNDING DONE FOR PT SAFETY.

## 2020-12-25 NOTE — CON
[address]  [address]                    CONSULTATION                  
_______________________________________________________________________________
 
Name:       TRACEEHARINI ROSE                   Room:           M.212-P    DIS IN  
M.#:  [id number]      Account #:      [id number]  
Admission:  12/21/20     Attend Phys:    Rachana Hamilton MD 
Discharge:  12/24/20     Date of Birth:  [birth year]  
         Report #: [id number]
                                                                     [id number]  
_______________________________________________________________________________
THIS REPORT FOR:  
 
cc:  FAM - No family physician/PCP 
     FAM - No family physician/PCP                                        ~
     Edmond Bills MD          
 
 
DATE OF SERVICE:  12/21/2020
 
 
HISTORY OF PRESENT ILLNESS:  This is a 50-year-old female patient who was seen
by me for any etiology for the patient's altered mental status.  This patient's
records were reviewed.  This patient was admitted in November here for
confusion.  I reviewed those records and she was taking benzodiazepine that
time, she had some hypoglycemia, she had CO2 retention, hypokalemia and
hypomagnesemia.  She improved after Narcan and same thing happened here and she
again is having hypercarbia and pulmonologist is being consulted.  Opiate
toxicity was expected, but was never proven.
 
REVIEW OF SYSTEMS:  Positive for metabolic encephalopathy and COPD exacerbation.
 She has a history of diabetes, morbid obesity, chronic pain, chronic
obstructive pulmonary disease and obstructive sleep apnea.  This was the
relevant 14-point review of system.  She also has a history of anxiety.  A
14-point review of system was otherwise noncontributory.
 
PAST MEDICAL HISTORY:  Positive for lung problems.
 
FAMILY HISTORY:  Unremarkable.
 
SOCIAL HISTORY:  She has a prior history of smoking.
 
PHYSICAL EXAMINATION:  The patient's examination when it was carried out showed
that she was alert, responsive, somewhat poor memory, but she had woken up and
she was able to tell me what month was, but overall her memory was poor.  Speech
looks intact.  Cranial nerve examination was mostly unremarkable.  She moved all
4 extremities.  Her position sense is intact.  Reflexes were diminished.  There
is no meningeal sign.  I could not look at the patient's fundus.  No carotid
bruit was noticed.  She does not appear to have any marked edema or cyanosis. 
She has severe hypercarbia.  Cardiac examination is unremarkable.  Her vision
and hearing looks adequate.  No thyroid mass, no carotid bruit.  Blood pressure
is 119/61, respirations 11, pulse is 108 and temperature is 98.8.
 
IMAGING DATA:  She did not have any imaging study.  Last blood gases indicate a
pCO2 of 129.
 
IMPRESSION AND PLAN:  This patient's symptom appeared to be secondary to her
 
 
 
[place] [address]                    CONSULTATION                  
_______________________________________________________________________________
 
Name:       AIYANA EASLEYYCE ROSE                   Room:           M.212-P    DIS IN  
M.R.#:  [id number]      Account #:      [id number]  
Admission:  12/21/20     Attend Phys:    Rachana Hamilton MD 
Discharge:  12/24/20     Date of Birth:  [birth year]  
         Report #: [id number]
                                                                     [id number]  
_______________________________________________________________________________
 
 
increased pCO2.  Her screen for benzo and opiate is positive.  In combination,
that cause a significant respiratory distress.  I will suggest getting a CT scan
of the head, but we need to await evaluation by Pulmonology and see what they
think the cause of the patient's problem is.  She does not give any history to
suggest any neurological problems which can cause the patient's symptom like
myasthenia gravis.
 
Thank you very much for this referral and if you have any questions, please feel
free to contact me.
 
 
 
 
 
 
 
 
 
 
 
 
 
 
 
 
 
 
 
 
 
 
 
 
 
 
 
 
 
 
 
 
 
<ELECTRONICALLY SIGNED>
                                        By:  Edmond Bills MD         
12/25/20     1352
D: 12/21/20 1944_______________________________________
T: 12/21/20 2024Pmima Bills MD            /nt

## 2021-08-14 ENCOUNTER — HOSPITAL ENCOUNTER (INPATIENT)
Dept: HOSPITAL 96 - M.ERS | Age: 51
LOS: 4 days | Discharge: HOME HEALTH | DRG: 191 | End: 2021-08-18
Attending: INTERNAL MEDICINE | Admitting: INTERNAL MEDICINE
Payer: COMMERCIAL

## 2021-08-14 VITALS — HEIGHT: 62.01 IN | BODY MASS INDEX: 48.33 KG/M2 | WEIGHT: 266 LBS

## 2021-08-14 VITALS — DIASTOLIC BLOOD PRESSURE: 56 MMHG | SYSTOLIC BLOOD PRESSURE: 107 MMHG

## 2021-08-14 DIAGNOSIS — M54.9: ICD-10-CM

## 2021-08-14 DIAGNOSIS — Z86.73: ICD-10-CM

## 2021-08-14 DIAGNOSIS — E87.2: ICD-10-CM

## 2021-08-14 DIAGNOSIS — J96.11: ICD-10-CM

## 2021-08-14 DIAGNOSIS — Z88.8: ICD-10-CM

## 2021-08-14 DIAGNOSIS — Z79.899: ICD-10-CM

## 2021-08-14 DIAGNOSIS — Z20.822: ICD-10-CM

## 2021-08-14 DIAGNOSIS — Z88.1: ICD-10-CM

## 2021-08-14 DIAGNOSIS — J44.1: Primary | ICD-10-CM

## 2021-08-14 DIAGNOSIS — R47.1: ICD-10-CM

## 2021-08-14 DIAGNOSIS — E78.5: ICD-10-CM

## 2021-08-14 DIAGNOSIS — Z79.4: ICD-10-CM

## 2021-08-14 DIAGNOSIS — Z87.891: ICD-10-CM

## 2021-08-14 DIAGNOSIS — G43.909: ICD-10-CM

## 2021-08-14 DIAGNOSIS — K21.9: ICD-10-CM

## 2021-08-14 DIAGNOSIS — I10: ICD-10-CM

## 2021-08-14 DIAGNOSIS — G47.33: ICD-10-CM

## 2021-08-14 DIAGNOSIS — Z90.49: ICD-10-CM

## 2021-08-14 DIAGNOSIS — N28.1: ICD-10-CM

## 2021-08-14 DIAGNOSIS — Z88.2: ICD-10-CM

## 2021-08-14 DIAGNOSIS — Z88.0: ICD-10-CM

## 2021-08-14 DIAGNOSIS — G89.29: ICD-10-CM

## 2021-08-14 DIAGNOSIS — F41.1: ICD-10-CM

## 2021-08-14 DIAGNOSIS — E66.01: ICD-10-CM

## 2021-08-14 DIAGNOSIS — E11.40: ICD-10-CM

## 2021-08-14 LAB
ABSOLUTE BASOPHILS: 0.1 THOU/UL (ref 0–0.2)
ABSOLUTE EOSINOPHILS: 0.6 THOU/UL (ref 0–0.7)
ABSOLUTE MONOCYTES: 0.4 THOU/UL (ref 0–1.2)
ALBUMIN SERPL-MCNC: 3.2 G/DL (ref 3.4–5)
ALP SERPL-CCNC: 77 U/L (ref 46–116)
ALT SERPL-CCNC: 51 U/L (ref 30–65)
ANION GAP SERPL CALC-SCNC: 7 MMOL/L (ref 7–16)
AST SERPL-CCNC: 19 U/L (ref 15–37)
BASOPHILS NFR BLD AUTO: 0.7 %
BILIRUB SERPL-MCNC: 0.2 MG/DL
BILIRUB UR-MCNC: NEGATIVE MG/DL
BUN SERPL-MCNC: 12 MG/DL (ref 7–18)
CALCIUM SERPL-MCNC: 8.2 MG/DL (ref 8.5–10.1)
CHLORIDE SERPL-SCNC: 99 MMOL/L (ref 98–107)
CO2 SERPL-SCNC: 32 MMOL/L (ref 21–32)
COLOR UR: YELLOW
CREAT SERPL-MCNC: 0.9 MG/DL (ref 0.6–1.3)
EOSINOPHIL NFR BLD: 6.8 %
GLUCOSE SERPL-MCNC: 156 MG/DL (ref 70–99)
GRANULOCYTES NFR BLD MANUAL: 67.8 %
HCT VFR BLD CALC: 32.6 % (ref 37–47)
HGB BLD-MCNC: 10.5 GM/DL (ref 12–15)
KETONES UR STRIP-MCNC: NEGATIVE MG/DL
LIPASE: 100 U/L (ref 73–393)
LYMPHOCYTES # BLD: 1.8 THOU/UL (ref 0.8–5.3)
LYMPHOCYTES NFR BLD AUTO: 20.1 %
MAGNESIUM SERPL-MCNC: 1.4 MG/DL (ref 1.8–2.4)
MCH RBC QN AUTO: 27 PG (ref 26–34)
MCHC RBC AUTO-ENTMCNC: 32.2 G/DL (ref 28–37)
MCV RBC: 84 FL (ref 80–100)
MONOCYTES NFR BLD: 4.6 %
MPV: 6.8 FL. (ref 7.2–11.1)
NEUTROPHILS # BLD: 6.1 THOU/UL (ref 1.6–8.1)
NT-PRO BRAIN NAT PEPTIDE: 23 PG/ML (ref ?–300)
NUCLEATED RBCS: 0 /100WBC
PLATELET COUNT*: 321 THOU/UL (ref 150–400)
POTASSIUM SERPL-SCNC: 3.6 MMOL/L (ref 3.5–5.1)
PROT SERPL-MCNC: 6.3 G/DL (ref 6.4–8.2)
PROT UR QL STRIP: NEGATIVE
RBC # BLD AUTO: 3.88 MIL/UL (ref 4.2–5)
RBC # UR STRIP: NEGATIVE /UL
RDW-CV: 16.1 % (ref 10.5–14.5)
SODIUM SERPL-SCNC: 138 MMOL/L (ref 136–145)
SP GR UR STRIP: <= 1.005 (ref 1–1.03)
URINE CLARITY: CLEAR
URINE GLUCOSE-RANDOM: (no result)
URINE LEUKOCYTES-REFLEX: NEGATIVE
URINE NITRITE-REFLEX: NEGATIVE
UROBILINOGEN UR STRIP-ACNC: 0.2 E.U./DL (ref 0.2–1)
WBC # BLD AUTO: 9 THOU/UL (ref 4–11)

## 2021-08-15 VITALS — SYSTOLIC BLOOD PRESSURE: 135 MMHG | DIASTOLIC BLOOD PRESSURE: 64 MMHG

## 2021-08-15 VITALS — SYSTOLIC BLOOD PRESSURE: 122 MMHG | DIASTOLIC BLOOD PRESSURE: 65 MMHG

## 2021-08-15 VITALS — DIASTOLIC BLOOD PRESSURE: 53 MMHG | SYSTOLIC BLOOD PRESSURE: 122 MMHG

## 2021-08-15 VITALS — DIASTOLIC BLOOD PRESSURE: 67 MMHG | SYSTOLIC BLOOD PRESSURE: 137 MMHG

## 2021-08-15 VITALS — SYSTOLIC BLOOD PRESSURE: 103 MMHG | DIASTOLIC BLOOD PRESSURE: 45 MMHG

## 2021-08-15 VITALS — DIASTOLIC BLOOD PRESSURE: 67 MMHG | SYSTOLIC BLOOD PRESSURE: 128 MMHG

## 2021-08-15 LAB
ALBUMIN SERPL-MCNC: 3.1 G/DL (ref 3.4–5)
ALP SERPL-CCNC: 69 U/L (ref 46–116)
ALT SERPL-CCNC: 53 U/L (ref 30–65)
ANION GAP SERPL CALC-SCNC: 7 MMOL/L (ref 7–16)
AST SERPL-CCNC: 22 U/L (ref 15–37)
BE: 4.8 MMOL/L
BILIRUB SERPL-MCNC: 0.3 MG/DL
BILIRUB UR-MCNC: NEGATIVE MG/DL
BUN SERPL-MCNC: 10 MG/DL (ref 7–18)
CALCIUM SERPL-MCNC: 8.3 MG/DL (ref 8.5–10.1)
CHLORIDE SERPL-SCNC: 101 MMOL/L (ref 98–107)
CHOLEST SERPL-MCNC: 111 MG/DL (ref ?–200)
CO2 SERPL-SCNC: 31 MMOL/L (ref 21–32)
COLOR UR: YELLOW
CREAT SERPL-MCNC: 0.7 MG/DL (ref 0.6–1.3)
GLUCOSE SERPL-MCNC: 181 MG/DL (ref 70–99)
HDLC SERPL-MCNC: 45 MG/DL (ref 40–?)
KETONES UR STRIP-MCNC: NEGATIVE MG/DL
LDLC SERPL-MCNC: 50 MG/DL (ref ?–100)
PCO2 BLD: 52.6 MMHG (ref 35–45)
PO2 BLD: 110.7 MMHG (ref 75–100)
POTASSIUM SERPL-SCNC: 3.6 MMOL/L (ref 3.5–5.1)
PROT SERPL-MCNC: 6.2 G/DL (ref 6.4–8.2)
PROT UR QL STRIP: NEGATIVE
RBC # UR STRIP: NEGATIVE /UL
SODIUM SERPL-SCNC: 139 MMOL/L (ref 136–145)
SP GR UR STRIP: <= 1.005 (ref 1–1.03)
TC:HDL: 2.5 RATIO
TRIGL SERPL-MCNC: 81 MG/DL (ref ?–150)
URINE CLARITY: CLEAR
URINE GLUCOSE-RANDOM: (no result)
URINE LEUKOCYTES-REFLEX: NEGATIVE
URINE NITRITE-REFLEX: NEGATIVE
UROBILINOGEN UR STRIP-ACNC: 0.2 E.U./DL (ref 0.2–1)
VLDLC SERPL CALC-MCNC: 16 MG/DL (ref ?–40)

## 2021-08-15 NOTE — EKG
[place] [address]
Phone:  [phone]                     ELECTROCARDIOGRAM REPORT      
_______________________________________________________________________________
 
Name:         HARINI EASLEY                  Room:          [place] [address]   ADM IN 
[facility]#:    [id number]     Account #:     [id number]  
Admission:    08/15/21    Attend Phys:   Jose Juan Guerrero, 
Discharge:                Date of Birth: [birth year]  
Date of Service: 21  Report #:      [id number]
        [id number]
_______________________________________________________________________________
THIS REPORT FOR:  //name//                      
 
                         [facility] ED
                                       
Test Date:    2021               Test Time:    19:39:23
Pat Name:     HARINIROMAN EASLEY               Department:   
Patient ID:   [id number]            Room:         [facility]
Gender:       F                        Technician:   CLAUDIA
:          1970               Requested By: Windy Arriaga
Order Number: [id number] MD:   Hayden Lindsay
                                 Measurements
Intervals                              Axis          
Rate:         113                      P:            38
GA:           141                      QRS:          2
QRSD:         99                       T:            8
QT:           343                                    
QTc:          471                                    
                           Interpretive Statements
Sinus tachycardia
Inferior infarct, old
Compared to ECG 2020 22:47:30
Myocardial infarct finding now present
Inferior Q waves no longer present
Q waves no longer present
T-wave abnormality no longer present
Prolonged QT interval no longer present
Electronically Signed On 8- 10:50:44 CDT by Hayden Lindsay
[email or web address]
 
 
 
 
 
 
 
 
 
 
 
 
 
 
 
 
  <ELECTRONICALLY SIGNED>
                                           By: BRITNEY Lindsay MD, [facility]    
  08/15/21     1050
D: 21   _____________________________________
T: 21   BRITNEY Lindsay MD, [facility]      /EPI

## 2021-08-16 VITALS — SYSTOLIC BLOOD PRESSURE: 122 MMHG | DIASTOLIC BLOOD PRESSURE: 50 MMHG

## 2021-08-16 VITALS — SYSTOLIC BLOOD PRESSURE: 118 MMHG | DIASTOLIC BLOOD PRESSURE: 56 MMHG

## 2021-08-16 VITALS — SYSTOLIC BLOOD PRESSURE: 118 MMHG | DIASTOLIC BLOOD PRESSURE: 64 MMHG

## 2021-08-16 VITALS — DIASTOLIC BLOOD PRESSURE: 68 MMHG | SYSTOLIC BLOOD PRESSURE: 121 MMHG

## 2021-08-16 VITALS — DIASTOLIC BLOOD PRESSURE: 69 MMHG | SYSTOLIC BLOOD PRESSURE: 136 MMHG

## 2021-08-16 VITALS — SYSTOLIC BLOOD PRESSURE: 93 MMHG | DIASTOLIC BLOOD PRESSURE: 51 MMHG

## 2021-08-16 LAB
EST. AVERAGE GLUCOSE BLD GHB EST-MCNC: 194 MG/DL
GLYCOHEMOGLOBIN (HGB A1C): 8.4 % (ref 4.8–5.6)

## 2021-08-16 NOTE — NUR
CM COMPLETED THE INITIAL ASSESSMENT WITH THE PT WHO INDICATED SHE LIVES HOME
WITH HER S/O AND SON. PT USES CHAIR, GRAB BAR AND WALKER. PTIS CURRENT W/[facility]. PT STATED HER S/O CONTACTED [facility] TO INFORM THEM OF HER
ADMISSION. PT DENIES HX WITH SNF. CM TO CONT TO FOLLOW. PT TO [facility] W[facility].

## 2021-08-17 VITALS — SYSTOLIC BLOOD PRESSURE: 136 MMHG | DIASTOLIC BLOOD PRESSURE: 57 MMHG

## 2021-08-17 VITALS — SYSTOLIC BLOOD PRESSURE: 111 MMHG | DIASTOLIC BLOOD PRESSURE: 52 MMHG

## 2021-08-17 VITALS — DIASTOLIC BLOOD PRESSURE: 70 MMHG | SYSTOLIC BLOOD PRESSURE: 102 MMHG

## 2021-08-17 VITALS — DIASTOLIC BLOOD PRESSURE: 50 MMHG | SYSTOLIC BLOOD PRESSURE: 100 MMHG

## 2021-08-17 VITALS — DIASTOLIC BLOOD PRESSURE: 45 MMHG | SYSTOLIC BLOOD PRESSURE: 105 MMHG

## 2021-08-17 VITALS — DIASTOLIC BLOOD PRESSURE: 62 MMHG | SYSTOLIC BLOOD PRESSURE: 131 MMHG

## 2021-08-17 LAB
ABSOLUTE BASOPHILS: 0.1 THOU/UL (ref 0–0.2)
ABSOLUTE EOSINOPHILS: 0.1 THOU/UL (ref 0–0.7)
ABSOLUTE MONOCYTES: 0.6 THOU/UL (ref 0–1.2)
ANION GAP SERPL CALC-SCNC: 6 MMOL/L (ref 7–16)
BASOPHILS NFR BLD AUTO: 0.6 %
BUN SERPL-MCNC: 18 MG/DL (ref 7–18)
CALCIUM SERPL-MCNC: 8.6 MG/DL (ref 8.5–10.1)
CHLORIDE SERPL-SCNC: 102 MMOL/L (ref 98–107)
CO2 SERPL-SCNC: 33 MMOL/L (ref 21–32)
CREAT SERPL-MCNC: 0.9 MG/DL (ref 0.6–1.3)
EOSINOPHIL NFR BLD: 0.6 %
GLUCOSE SERPL-MCNC: 289 MG/DL (ref 70–99)
GRANULOCYTES NFR BLD MANUAL: 77.9 %
HCT VFR BLD CALC: 32.5 % (ref 37–47)
HGB BLD-MCNC: 10.2 GM/DL (ref 12–15)
LYMPHOCYTES # BLD: 1.5 THOU/UL (ref 0.8–5.3)
LYMPHOCYTES NFR BLD AUTO: 15.2 %
MCH RBC QN AUTO: 26.9 PG (ref 26–34)
MCHC RBC AUTO-ENTMCNC: 31.4 G/DL (ref 28–37)
MCV RBC: 85.5 FL (ref 80–100)
MONOCYTES NFR BLD: 5.7 %
MPV: 6.6 FL. (ref 7.2–11.1)
NEUTROPHILS # BLD: 7.9 THOU/UL (ref 1.6–8.1)
NUCLEATED RBCS: 0 /100WBC
PLATELET COUNT*: 398 THOU/UL (ref 150–400)
POTASSIUM SERPL-SCNC: 3.7 MMOL/L (ref 3.5–5.1)
RBC # BLD AUTO: 3.8 MIL/UL (ref 4.2–5)
RDW-CV: 16.7 % (ref 10.5–14.5)
SODIUM SERPL-SCNC: 141 MMOL/L (ref 136–145)
WBC # BLD AUTO: 10.1 THOU/UL (ref 4–11)

## 2021-08-17 NOTE — NUR
PT A&O X 4. ON 3.5L O2. C/O CHRONIC BACK PAIN, MORPHINE GIVEN X 2. UP TO BR
WITH SBA. PT SLEPT MOST OF THE NIGHT. CALL LIGHT WITHIN REACH. WILL CONTINUE
TO MONITOR.

## 2021-08-17 NOTE — NUR
Pt discharging to [facility] today. Facility to  and transport at
3pm. Faxed dc orders. Chart copied. Nurse report number is [phone]. Updated
dtr.

## 2021-08-18 VITALS — DIASTOLIC BLOOD PRESSURE: 52 MMHG | SYSTOLIC BLOOD PRESSURE: 107 MMHG

## 2021-08-18 VITALS — DIASTOLIC BLOOD PRESSURE: 85 MMHG | SYSTOLIC BLOOD PRESSURE: 155 MMHG

## 2021-08-18 VITALS — SYSTOLIC BLOOD PRESSURE: 107 MMHG | DIASTOLIC BLOOD PRESSURE: 52 MMHG

## 2021-08-18 VITALS — SYSTOLIC BLOOD PRESSURE: 150 MMHG | DIASTOLIC BLOOD PRESSURE: 81 MMHG

## 2021-08-18 VITALS — SYSTOLIC BLOOD PRESSURE: 178 MMHG | DIASTOLIC BLOOD PRESSURE: 90 MMHG

## 2021-08-18 LAB
ANION GAP SERPL CALC-SCNC: 8 MMOL/L (ref 7–16)
BUN SERPL-MCNC: 26 MG/DL (ref 7–18)
CALCIUM SERPL-MCNC: 8.5 MG/DL (ref 8.5–10.1)
CHLORIDE SERPL-SCNC: 101 MMOL/L (ref 98–107)
CO2 SERPL-SCNC: 32 MMOL/L (ref 21–32)
CREAT SERPL-MCNC: 1 MG/DL (ref 0.6–1.3)
GLUCOSE SERPL-MCNC: 423 MG/DL (ref 70–99)
HCT VFR BLD CALC: 33.9 % (ref 37–47)
HGB BLD-MCNC: 10.4 GM/DL (ref 12–15)
MCH RBC QN AUTO: 27.3 PG (ref 26–34)
MCHC RBC AUTO-ENTMCNC: 30.8 G/DL (ref 28–37)
MCV RBC: 88.6 FL (ref 80–100)
MPV: 6.9 FL. (ref 7.2–11.1)
PLATELET COUNT*: 429 THOU/UL (ref 150–400)
POTASSIUM SERPL-SCNC: 4.5 MMOL/L (ref 3.5–5.1)
RBC # BLD AUTO: 3.82 MIL/UL (ref 4.2–5)
RDW-CV: 16.6 % (ref 10.5–14.5)
SODIUM SERPL-SCNC: 141 MMOL/L (ref 136–145)
WBC # BLD AUTO: 10.3 THOU/UL (ref 4–11)

## 2021-08-18 NOTE — NUR
PT DISCHARGED AT 1750 WAS WHEELED OUT BY STAFF ON 3L NC TO FAMILY CAR. PT
GIVEN DISCHARGE INSTRUCTIONS AND ALL BELONGINGS. IV REMOVED FROM RIGHT CHEST.
PT SAYS SHE UNDERSTANDS HER DISCHARGE [facility]ILOLY AND [facility]
IS GOING TO CALL HER TOMORROW TO SET UP AN APPOINTMENT.